# Patient Record
Sex: FEMALE | HISPANIC OR LATINO | Employment: UNEMPLOYED | URBAN - METROPOLITAN AREA
[De-identification: names, ages, dates, MRNs, and addresses within clinical notes are randomized per-mention and may not be internally consistent; named-entity substitution may affect disease eponyms.]

---

## 2017-01-12 ENCOUNTER — TELEPHONE (OUTPATIENT)
Dept: OBSTETRICS AND GYNECOLOGY | Facility: CLINIC | Age: 32
End: 2017-01-12

## 2017-01-12 NOTE — TELEPHONE ENCOUNTER
----- Message from Kimberlynsuma Mccoy sent at 1/12/2017  2:10 PM CST -----  Contact: self  Pt would like to know if she can come in on Monday for her post-op that she missed. She states that she is having some vaginal bleeding. She also want to know if it's okay for her to see another ob/gyn specialist in the clinic for her post-op if Dr. Morelos doesn't not have an availability.  [phone]. Thanks!

## 2017-01-12 NOTE — TELEPHONE ENCOUNTER
----- Message from Kimberlynsuma Mccoy sent at 1/12/2017  2:10 PM CST -----  Contact: self  Pt would like to know if she can come in on Monday for her post-op that she missed. She states that she is having some vaginal bleeding. She also want to know if it's okay for her to see another ob/gyn specialist in the clinic for her post-op if Dr. Morelos doesn't not have an availability.  [phone]. Thanks!

## 2017-01-12 NOTE — TELEPHONE ENCOUNTER
Left message for patient in regards to appointment. Have scheduled patient, patient will call back.

## 2017-01-16 ENCOUNTER — OFFICE VISIT (OUTPATIENT)
Dept: OBSTETRICS AND GYNECOLOGY | Facility: CLINIC | Age: 32
End: 2017-01-16
Payer: MEDICAID

## 2017-01-16 VITALS
SYSTOLIC BLOOD PRESSURE: 120 MMHG | WEIGHT: 178.56 LBS | HEIGHT: 65 IN | DIASTOLIC BLOOD PRESSURE: 76 MMHG | BODY MASS INDEX: 29.75 KG/M2

## 2017-01-16 DIAGNOSIS — N76.0 BACTERIAL VAGINOSIS: ICD-10-CM

## 2017-01-16 DIAGNOSIS — B96.89 BACTERIAL VAGINOSIS: ICD-10-CM

## 2017-01-16 DIAGNOSIS — N93.9 VAGINAL BLEEDING: Primary | ICD-10-CM

## 2017-01-16 PROCEDURE — 99212 OFFICE O/P EST SF 10 MIN: CPT | Mod: PBBFAC | Performed by: OBSTETRICS & GYNECOLOGY

## 2017-01-16 PROCEDURE — 99999 PR PBB SHADOW E&M-EST. PATIENT-LVL II: CPT | Mod: PBBFAC,,, | Performed by: OBSTETRICS & GYNECOLOGY

## 2017-01-16 PROCEDURE — 99024 POSTOP FOLLOW-UP VISIT: CPT | Mod: S$PBB,,, | Performed by: OBSTETRICS & GYNECOLOGY

## 2017-01-16 RX ORDER — METRONIDAZOLE 500 MG/1
500 TABLET ORAL EVERY 12 HOURS
Qty: 14 TABLET | Refills: 0 | Status: SHIPPED | OUTPATIENT
Start: 2017-01-16 | End: 2017-01-23

## 2017-01-16 NOTE — PROGRESS NOTES
Subjective:       Patient ID: Melissa Larios is a 31 y.o. female.    Chief Complaint:  Post-op Evaluation (lsh)      History of Present Illness  HPI  Pt is >6 weeks out from LSH. She reports vaginal bleeding. Had to use a tampon and it lasted for couple days. She reports premenstural symptoms. She is also reporting vaginal discharge with odor, and vulvar irritation.     GYN & OB History  Patient's last menstrual period was 11/15/2016.   Date of Last Pap: 2016    OB History    Para Term  AB SAB TAB Ectopic Multiple Living   5 3 3  1 1    3      # Outcome Date GA Lbr Ad/2nd Weight Sex Delivery Anes PTL Lv   5 Term 12 39w2d  3.697 kg (8 lb 2.4 oz) F Vag-Spont EPI N Y   4 SAB            3 Term 07   2.892 kg (6 lb 6 oz) F Vag-Spont EPI N Y   2 Term 06   3.204 kg (7 lb 1 oz) M Vag-Spont EPI N Y   1                    Review of Systems  Review of Systems   Constitutional: Negative for chills and fever.   Gastrointestinal: Negative for abdominal pain.   Genitourinary: Positive for vaginal discharge and vaginal odor. Negative for pelvic pain.           Objective:    Physical Exam:   Constitutional: She is oriented to person, place, and time. She appears well-developed and well-nourished. No distress.    HENT:   Head: Normocephalic and atraumatic.    Eyes: EOM are normal.      Pulmonary/Chest: No respiratory distress.        Abdominal: Soft. Normal appearance. She exhibits no distension. There is no tenderness. There is no rigidity, no rebound and no guarding.     Genitourinary: There is no rash, tenderness, lesion or injury on the right labia. There is no rash, tenderness, lesion or injury on the left labia. Uterus is absent. Right adnexum displays no mass, no tenderness and no fullness. Left adnexum displays no mass, no tenderness and no fullness. No erythema, tenderness or bleeding in the vagina. Vaginal discharge found. Cervix exhibits friability. Cervix exhibits no  motion tenderness and no discharge.               Neurological: She is alert and oriented to person, place, and time.     Psychiatric: She has a normal mood and affect. Her behavior is normal.          Assessment:        1. Vaginal bleeding    2. Bacterial vaginosis              Plan:      1. Vaginal bleeding  - Cervix is friable. Will give Rx for flagyl to treat BV and possible cervicitis. We discussed that she may have small cycle each month due to cervix being left. She might have some endometrial tissue in the cervix/lower uterine segment that will bleed with hormone changes.     2. Bacterial vaginosis  - metronidazole (FLAGYL) 500 MG tablet; Take 1 tablet (500 mg total) by mouth every 12 (twelve) hours.  Dispense: 14 tablet; Refill: 0       Return in about 7 months (around 8/16/2017) for Annual exam.

## 2017-02-15 ENCOUNTER — HOSPITAL ENCOUNTER (EMERGENCY)
Facility: HOSPITAL | Age: 32
Discharge: HOME | End: 2017-02-16
Attending: EMERGENCY MEDICINE
Payer: MEDICAID

## 2017-02-15 DIAGNOSIS — R10.9 RIGHT FLANK PAIN: Primary | ICD-10-CM

## 2017-02-15 LAB
ALBUMIN SERPL BCP-MCNC: 4 G/DL
ALP SERPL-CCNC: 66 U/L
ALT SERPL W/O P-5'-P-CCNC: 36 U/L
ANION GAP SERPL CALC-SCNC: 9 MMOL/L
AST SERPL-CCNC: 27 U/L
B-HCG UR QL: NEGATIVE
BACTERIA #/AREA URNS HPF: ABNORMAL /HPF
BASOPHILS # BLD AUTO: 0.04 K/UL
BASOPHILS NFR BLD: 0.6 %
BILIRUB SERPL-MCNC: 0.4 MG/DL
BILIRUB UR QL STRIP: NEGATIVE
BUN SERPL-MCNC: 9 MG/DL
CALCIUM SERPL-MCNC: 9.8 MG/DL
CHLORIDE SERPL-SCNC: 105 MMOL/L
CLARITY UR: CLEAR
CO2 SERPL-SCNC: 25 MMOL/L
COLOR UR: YELLOW
CREAT SERPL-MCNC: 0.7 MG/DL
CTP QC/QA: YES
DIFFERENTIAL METHOD: ABNORMAL
EOSINOPHIL # BLD AUTO: 0.2 K/UL
EOSINOPHIL NFR BLD: 2.3 %
ERYTHROCYTE [DISTWIDTH] IN BLOOD BY AUTOMATED COUNT: 13.1 %
EST. GFR  (AFRICAN AMERICAN): >60 ML/MIN/1.73 M^2
EST. GFR  (NON AFRICAN AMERICAN): >60 ML/MIN/1.73 M^2
GLUCOSE SERPL-MCNC: 89 MG/DL
GLUCOSE UR QL STRIP: NEGATIVE
HCT VFR BLD AUTO: 39.7 %
HGB BLD-MCNC: 13.6 G/DL
HGB UR QL STRIP: ABNORMAL
KETONES UR QL STRIP: NEGATIVE
LEUKOCYTE ESTERASE UR QL STRIP: NEGATIVE
LYMPHOCYTES # BLD AUTO: 2.6 K/UL
LYMPHOCYTES NFR BLD: 37.9 %
MCH RBC QN AUTO: 31.7 PG
MCHC RBC AUTO-ENTMCNC: 34.3 %
MCV RBC AUTO: 93 FL
MICROSCOPIC COMMENT: ABNORMAL
MONOCYTES # BLD AUTO: 0.6 K/UL
MONOCYTES NFR BLD: 8.9 %
NEUTROPHILS # BLD AUTO: 3.4 K/UL
NEUTROPHILS NFR BLD: 50.3 %
NITRITE UR QL STRIP: NEGATIVE
PH UR STRIP: 7 [PH] (ref 5–8)
PLATELET # BLD AUTO: 284 K/UL
PMV BLD AUTO: 10.7 FL
POTASSIUM SERPL-SCNC: 3.8 MMOL/L
PROT SERPL-MCNC: 7.5 G/DL
PROT UR QL STRIP: NEGATIVE
RBC # BLD AUTO: 4.29 M/UL
RBC #/AREA URNS HPF: 10 /HPF (ref 0–4)
SODIUM SERPL-SCNC: 139 MMOL/L
SP GR UR STRIP: 1.01 (ref 1–1.03)
SQUAMOUS #/AREA URNS HPF: 3 /HPF
URN SPEC COLLECT METH UR: ABNORMAL
UROBILINOGEN UR STRIP-ACNC: NEGATIVE EU/DL
WBC # BLD AUTO: 6.84 K/UL
WBC #/AREA URNS HPF: 2 /HPF (ref 0–5)

## 2017-02-15 PROCEDURE — 85025 COMPLETE CBC W/AUTO DIFF WBC: CPT

## 2017-02-15 PROCEDURE — 96375 TX/PRO/DX INJ NEW DRUG ADDON: CPT

## 2017-02-15 PROCEDURE — 96374 THER/PROPH/DIAG INJ IV PUSH: CPT

## 2017-02-15 PROCEDURE — 25000003 PHARM REV CODE 250: Performed by: PHYSICIAN ASSISTANT

## 2017-02-15 PROCEDURE — 87086 URINE CULTURE/COLONY COUNT: CPT

## 2017-02-15 PROCEDURE — 81000 URINALYSIS NONAUTO W/SCOPE: CPT

## 2017-02-15 PROCEDURE — 87186 SC STD MICRODIL/AGAR DIL: CPT

## 2017-02-15 PROCEDURE — 87088 URINE BACTERIA CULTURE: CPT

## 2017-02-15 PROCEDURE — 87077 CULTURE AEROBIC IDENTIFY: CPT

## 2017-02-15 PROCEDURE — 96361 HYDRATE IV INFUSION ADD-ON: CPT

## 2017-02-15 PROCEDURE — 99284 EMERGENCY DEPT VISIT MOD MDM: CPT | Mod: 25

## 2017-02-15 PROCEDURE — 63600175 PHARM REV CODE 636 W HCPCS: Performed by: PHYSICIAN ASSISTANT

## 2017-02-15 PROCEDURE — 80053 COMPREHEN METABOLIC PANEL: CPT

## 2017-02-15 PROCEDURE — 81025 URINE PREGNANCY TEST: CPT | Performed by: EMERGENCY MEDICINE

## 2017-02-15 RX ORDER — KETOROLAC TROMETHAMINE 30 MG/ML
15 INJECTION, SOLUTION INTRAMUSCULAR; INTRAVENOUS
Status: COMPLETED | OUTPATIENT
Start: 2017-02-15 | End: 2017-02-15

## 2017-02-15 RX ORDER — NAPROXEN 500 MG/1
250 TABLET ORAL 2 TIMES DAILY WITH MEALS
Qty: 12 TABLET | Refills: 0 | Status: SHIPPED | OUTPATIENT
Start: 2017-02-15 | End: 2017-02-27

## 2017-02-15 RX ORDER — ONDANSETRON 4 MG/1
4 TABLET, FILM COATED ORAL EVERY 6 HOURS PRN
Qty: 12 TABLET | Refills: 0 | Status: SHIPPED | OUTPATIENT
Start: 2017-02-15 | End: 2017-02-20

## 2017-02-15 RX ORDER — KETOROLAC TROMETHAMINE 30 MG/ML
15 INJECTION, SOLUTION INTRAMUSCULAR; INTRAVENOUS
Status: DISCONTINUED | OUTPATIENT
Start: 2017-02-15 | End: 2017-02-15

## 2017-02-15 RX ORDER — ONDANSETRON 2 MG/ML
4 INJECTION INTRAMUSCULAR; INTRAVENOUS
Status: COMPLETED | OUTPATIENT
Start: 2017-02-15 | End: 2017-02-15

## 2017-02-15 RX ADMIN — ONDANSETRON 4 MG: 2 INJECTION INTRAMUSCULAR; INTRAVENOUS at 11:02

## 2017-02-15 RX ADMIN — KETOROLAC TROMETHAMINE 15 MG: 30 INJECTION, SOLUTION INTRAMUSCULAR at 11:02

## 2017-02-15 RX ADMIN — SODIUM CHLORIDE 1000 ML: 0.9 INJECTION, SOLUTION INTRAVENOUS at 11:02

## 2017-02-15 NOTE — ED AVS SNAPSHOT
[facility]  [place] [address]               Melissa Larios   2/15/2017  9:57 PM   ED    Description:  Female : 1985   Department:  [facility]           Your Care was Coordinated By:     Provider Role From To    Jaime Holliday MD Attending Provider 02/15/17 2202 --    Anh Guevara PA-C Physician Assistant 02/15/17 2202 --      Reason for Visit     Back Pain     Female  Problem           Diagnoses this Visit        Comments    Right flank pain    -  Primary       ED Disposition     None           To Do List           Follow-up Information     Follow up with Rachel Meléndez MD. Schedule an appointment as soon as possible for a visit in 2 days.    Specialty:  Urology    Why:  for follow up    Contact information:    [address]  [place] [address]  [phone]          Go to [facility].    Specialty:  Emergency Medicine    Why:  As needed, If symptoms worsen, if unable to urinate or decreased urination    Contact information:    [place] [address] [phone]       These Medications        Disp Refills Start End    tamsulosin (FLOMAX) 0.4 mg Cp24 12 capsule 0 2/15/2017 2017    Take 1 capsule (0.4 mg total) by mouth once daily. - Oral    Pharmacy: [facility] [facility] [place] [address] Ph #: [phone]       ondansetron (ZOFRAN) 4 MG tablet 12 tablet 0 2/15/2017 2017    Take 1 tablet (4 mg total) by mouth every 6 (six) hours as needed for Nausea. - Oral    Pharmacy: [facility] [facility] [place] [facility] Ph #: [phone]       naproxen (NAPROSYN) 500 MG tablet 12 tablet 0 2/15/2017 2017    Take 0.5 tablets (250 mg total) by mouth 2 (two) times daily with meals. - Oral    Pharmacy: [facility] [facility] [place] [address] Ph #: [phone]         [facility]      [facility] - 24/7 Assistance  Registered nurses in the [facility] provide clinical advisement, health education, appointment booking, and other advisory services.  Call for this free service at [phone].             Medications           Message regarding Medications     Verify the changes and/or additions to your medication regime listed below are the same as discussed with your clinician today.  If any of these changes or additions are incorrect, please notify your healthcare provider.        START taking these NEW medications        Refills    tamsulosin (FLOMAX) 0.4 mg Cp24 0    Sig: Take 1 capsule (0.4 mg total) by mouth once daily.    Class: Print    Route: Oral    ondansetron (ZOFRAN) 4 MG tablet 0    Sig: Take 1 tablet (4 mg total) by mouth every 6 (six) hours as needed for Nausea.    Class: Print    Route: Oral    naproxen (NAPROSYN) 500 MG tablet 0    Sig: Take 0.5 tablets (250 mg total) by mouth 2 (two) times daily with meals.    Class: Print    Route: Oral      These medications were administered today        Dose Freq    sodium chloride 0.9% bolus 1,000 mL 1,000 mL ED 1 Time    Sig: Inject 1,000 mLs into the vein ED 1 Time.    Class: Normal    Route: Intravenous    Cosign for Ordering: Accepted by Jaime Holliday MD on 2/15/2017 11:06 PM    ondansetron injection 4 mg 4 mg ED 1 Time    Sig: Inject 4 mg into the vein ED 1 Time.    Class: Normal    Route: Intravenous    Cosign for Ordering: Accepted by Jaime Holliday MD on 2/15/2017 11:06 PM    ketorolac injection 15 mg 15 mg ED 1 Time    Sig: Inject 15 mg into the vein ED 1 Time.    Class: Normal    Route: Intravenous    Cosign for Ordering: Accepted by Jaime Holliday MD on 2/15/2017 11:06 PM      STOP taking these medications     ibuprofen (ADVIL,MOTRIN) 800 MG tablet Take 1 tablet (800 mg total) by mouth 4 (four) times daily.           Verify that the below list of medications is an accurate representation  "of the medications you are currently taking.  If none reported, the list may be blank. If incorrect, please contact your healthcare provider. Carry this list with you in case of emergency.           Current Medications     ferrous sulfate 325 (65 FE) MG EC tablet Take 325 mg by mouth 3 (three) times daily with meals.    naproxen (NAPROSYN) 500 MG tablet Take 0.5 tablets (250 mg total) by mouth 2 (two) times daily with meals.    ondansetron (ZOFRAN) 4 MG tablet Take 1 tablet (4 mg total) by mouth every 6 (six) hours as needed for Nausea.    tamsulosin (FLOMAX) 0.4 mg Cp24 Take 1 capsule (0.4 mg total) by mouth once daily.           Clinical Reference Information           Your Vitals Were     BP Pulse Temp Resp Height Weight    120/57 (BP Location: Right arm, Patient Position: Sitting) 91 98.5 °F (36.9 °C) (Oral) 20 5' 5" (1.651 m) 81.6 kg (180 lb)    Last Period SpO2 BMI          11/15/2016 96% 29.95 kg/m2        Allergies as of 2/15/2017        Reactions    Codeine Itching    All over itching    Hydrocodone Itching      Immunizations Administered on Date of Encounter - 2/15/2017     None      ED Micro, Lab, POCT     Start Ordered       Status Ordering Provider    02/15/17 2226 02/15/17 2227  CBC auto differential  STAT      Final result     02/15/17 2226 02/15/17 2227  Comprehensive metabolic panel  STAT      Final result     02/15/17 2204 02/15/17 2204  Urine culture **CANNOT BE ORDERED STAT**  Once      In process     02/15/17 2037 02/15/17 2036  Urinalysis  STAT      Final result     02/15/17 2036 02/15/17 2036  Urinalysis Microscopic  Once      Final result     02/15/17 2015 02/15/17 2014  POCT urine pregnancy  Once      Final result       ED Imaging Orders     Start Ordered       Status Ordering Provider    02/15/17 2227 02/15/17 2227  CT Renal Stone Study ABD Pelvis WO  1 time imaging      Final result         Discharge Instructions         Flank Pain, Uncertain Cause  The flank is the area between your upper " abdomen and your back. Pain there is often caused by a problem with your kidneys. It might be a kidney infection or a kidney stone. Other causes of flank pain include spinal arthritis, a pinched nerve from a back injury, or a back muscle strain or spasm.  The cause of your flank pain is not certain. You may need other tests.  Home care  Follow these tips when caring for yourself at home:  · You may use acetaminophen or ibuprofen to control pain, unless your health care provider prescribed another medicine. If you have chronic liver or kidney disease, talk with your provider before taking these medicines. Also talk with your provider first if youve ever had a stomach ulcer or GI bleeding.  · If the pain is coming from your muscles, you may get relief with ice or heat. During the first 2 days after the injury, put an ice pack on the painful area for 20 minutes every 2 to 4 hours. This will reduce swelling and pain. A hot shower, hot bath, or heating pad works well for a muscle spasm. You can start with ice, then switch to heat after 2 days. You might find that alternating ice and heat works well. Use the method that feels the best to you.  Follow-up care  Follow up with your healthcare provider if your symptoms dont get better over the next few days.  When to seek medical advice  Call your healthcare provider right away if any of these happen:  · Repeated vomiting  · Fever of 100.4ºF (38ºC) or higher, or as directed by your health care provider  · Flank pain that gets worse  · Pain that spreads to the front of your belly (abdomen)  · Dizziness, weakness, or fainting  · Blood in your urine  · Burning feeling when you urinate or the need to urinate often  · Pain in one of your legs that gets worse  · Numbness or weakness in a leg        [facility] Sign-Up     Activating your [facility] account is as easy as 1-2-3!     1) Visit [email or web address], select Sign Up Now, enter this activation code and your date of birth, then  select Next.  [email or web address]  Expires: 3/2/2017  2:54 PM      2) Create a username and password to use when you visit [facility] in the future and select a security question in case you lose your password and select Next.    3) Enter your e-mail address and click Sign Up!    Additional Information  If you have questions, please e-mail [email or web address] or call [phone] to talk to our [facility] staff. Remember, [facility] is NOT to be used for urgent needs. For medical emergencies, dial 911.          [facility] complies with applicable Federal civil rights laws and does not discriminate on the basis of race, color, national origin, age, disability, or sex.        Language Assistance Services     ATTENTION: Language assistance services are available, free of charge. Please call [phone].      ATENCIÓN: Si habla español, tiene a fox disposición servicios gratuitos de asistencia lingüística. Llame al [phone].     [facility] Ý: N?u b?n nói Ti?ng Vi?t, có các d?ch v? h? tr? ngôn ng? mi?n phí dành cho b?n. G?i s? [phone].

## 2017-02-16 VITALS
WEIGHT: 180 LBS | HEIGHT: 65 IN | HEART RATE: 62 BPM | TEMPERATURE: 98 F | BODY MASS INDEX: 29.99 KG/M2 | DIASTOLIC BLOOD PRESSURE: 72 MMHG | RESPIRATION RATE: 20 BRPM | OXYGEN SATURATION: 99 % | SYSTOLIC BLOOD PRESSURE: 120 MMHG

## 2017-02-16 NOTE — DISCHARGE INSTRUCTIONS
Flank Pain, Uncertain Cause  The flank is the area between your upper abdomen and your back. Pain there is often caused by a problem with your kidneys. It might be a kidney infection or a kidney stone. Other causes of flank pain include spinal arthritis, a pinched nerve from a back injury, or a back muscle strain or spasm.  The cause of your flank pain is not certain. You may need other tests.  Home care  Follow these tips when caring for yourself at home:  · You may use acetaminophen or ibuprofen to control pain, unless your health care provider prescribed another medicine. If you have chronic liver or kidney disease, talk with your provider before taking these medicines. Also talk with your provider first if youve ever had a stomach ulcer or GI bleeding.  · If the pain is coming from your muscles, you may get relief with ice or heat. During the first 2 days after the injury, put an ice pack on the painful area for 20 minutes every 2 to 4 hours. This will reduce swelling and pain. A hot shower, hot bath, or heating pad works well for a muscle spasm. You can start with ice, then switch to heat after 2 days. You might find that alternating ice and heat works well. Use the method that feels the best to you.  Follow-up care  Follow up with your healthcare provider if your symptoms dont get better over the next few days.  When to seek medical advice  Call your healthcare provider right away if any of these happen:  · Repeated vomiting  · Fever of 100.4ºF (38ºC) or higher, or as directed by your health care provider  · Flank pain that gets worse  · Pain that spreads to the front of your belly (abdomen)  · Dizziness, weakness, or fainting  · Blood in your urine  · Burning feeling when you urinate or the need to urinate often  · Pain in one of your legs that gets worse  · Numbness or weakness in a leg

## 2017-02-16 NOTE — ED PROVIDER NOTES
"Encounter Date: 2/15/2017    SCRIBE #1 NOTE: I, Rennyjesús Hurtado, am scribing for, and in the presence of,  Anh Guevara PA-C. I have scribed the following portions of the note - Other sections scribed: HPI/ROS.       History     Chief Complaint   Patient presents with    Back Pain     Right sided back pain that started last night. + Dysuria. Pt reports she thinks she has a kidney stone    Female  Problem     " I had a hysterectomy on the 14th of Nov but I am still bleeding vaginally."      Review of patient's allergies indicates:   Allergen Reactions    Codeine Itching     All over itching    Hydrocodone Itching     HPI Comments: CC: Female  Problem    HPI: This 31 y.o. Female presents to the ED for evaluation of right sided flank pain and malodorous urine for past 2 days and dysuria, nausea and urinary frequency that developed this morning. Pt states flank pain is sharp, 7/10, and worse with walking. Pt states she has attempted treatment with ibuprofen with no relief. States that swaying back and forth helps mildly alleviates symptoms. Pt states she has had 5 episodes of kidney stones in past 5 years. States this pain is similar and she will typically just pass them at home, but she is concerned because this time she feels like the stone is "not moving". She states previous imaging performed has never shown kidney stones, but she "knows there are stones because I passed them". Pt also reports she had hysterectomy and is still experiencing vaginal bleeding. Pt was evaluated for same complaint by OB/GYN approximately 1 month ago and they reassured her this was normal. Pt states that there is no change in the bleeding since last month and she stated she did not want pelvic exam today. The pt denies hematuria, abdominal pain, headaches, or lightheadedness.       The history is provided by the patient. No  was used.     Past Medical History   Diagnosis Date    Kidney stones     Rh " incompatibility 09/14/2012     rhogam given here    Vaginal delivery      x4     No past medical history pertinent negatives.  Past Surgical History   Procedure Laterality Date    Tubal ligation      Hysterectomy       Family History   Problem Relation Age of Onset    Diabetes Maternal Aunt     Diabetes Paternal Aunt     Vision loss Paternal Uncle     Diabetes Paternal Uncle     Arthritis Maternal Grandmother     Diabetes Maternal Grandmother     Hyperlipidemia Maternal Grandmother     Hypertension Maternal Grandmother     Arthritis Paternal Grandmother     Diabetes Paternal Grandmother     Hypertension Paternal Grandmother     Breast cancer Neg Hx     Colon cancer Neg Hx     Ovarian cancer Neg Hx      Social History   Substance Use Topics    Smoking status: Never Smoker    Smokeless tobacco: Never Used    Alcohol use No     Review of Systems   Constitutional: Negative for chills and fever.   HENT: Negative for congestion, rhinorrhea and sore throat.    Respiratory: Negative for shortness of breath.    Cardiovascular: Negative for chest pain.   Gastrointestinal: Positive for nausea. Negative for abdominal pain, constipation, diarrhea and vomiting.   Genitourinary: Positive for dysuria, flank pain, frequency and vaginal bleeding. Negative for decreased urine volume, difficulty urinating, hematuria, urgency and vaginal discharge.   Musculoskeletal: Negative for back pain.   Skin: Negative for rash.   Neurological: Negative for weakness, light-headedness and headaches.   Hematological: Does not bruise/bleed easily.       Physical Exam   Initial Vitals   BP Pulse Resp Temp SpO2   02/15/17 2030 02/15/17 2030 02/15/17 2030 02/15/17 2030 02/15/17 2030   120/57 91 20 98.5 °F (36.9 °C) 96 %     Physical Exam    Nursing note and vitals reviewed.  Constitutional: Vital signs are normal.   HENT:   Right Ear: External ear normal.   Left Ear: External ear normal.   Mouth/Throat: Oropharynx is clear and moist.  "  Eyes: Conjunctivae are normal.   Cardiovascular: Normal rate and regular rhythm. Exam reveals no gallop and no friction rub.    No murmur heard.  Pulmonary/Chest: Breath sounds normal. No respiratory distress. She has no wheezes. She has no rhonchi. She has no rales.   Abdominal: Soft. Bowel sounds are normal. She exhibits no distension. There is no tenderness. There is CVA tenderness (Right CVA tenderness). There is no rebound and no guarding.   Neurological: She is alert.   Skin: Skin is warm and dry. No rash noted.         ED Course   Procedures  Labs Reviewed   URINALYSIS - Abnormal; Notable for the following:        Result Value    Occult Blood UA 2+ (*)     All other components within normal limits   CBC W/ AUTO DIFFERENTIAL - Abnormal; Notable for the following:     MCH 31.7 (*)     All other components within normal limits   URINALYSIS MICROSCOPIC - Abnormal; Notable for the following:     RBC, UA 10 (*)     Bacteria, UA Moderate (*)     All other components within normal limits   CULTURE, URINE   COMPREHENSIVE METABOLIC PANEL   POCT URINE PREGNANCY             Medical Decision Making:   Initial Assessment:   Pt is 30 y/o female who presents for evaluation of R flank pain since Monday night with associated malodorous urine and dysuria since this morning. Pt reports history of 5 episodes of kidney stones in the past 5 years. She states she normally passes them at home, but came in for evaluation this time because she "feels like the stone is not moving." Denies difficulty urinating, weak stream, decreased urination, or hematuria. On exam, pt afebrile, standing and swaying back and forth "to relieve the pain." Physical exam remarkable for right CVA tenderness. No abdominal tenderness. Pt given fluids, toradol, and zofran. CBC, CMP unremarkable. UA remarkable for 2+ occult blood, moderate bacteria. CT renal stone study negative for kidney stones. Discharged pt to home with flomax, zofran and naproxen and " urology follow up. Return to ER if symptoms worsen or if decreased urine output, or unable to urinate.    Discussed this pt with Dr. Holliday who agrees with assessment and plan.             Scribe Attestation:   Scribe #1: I performed the above scribed service and the documentation accurately describes the services I performed. I attest to the accuracy of the note.    Attending Attestation:     Physician Attestation Statement for NP/PA:   I discussed this assessment and plan of this patient with the NP/PA, but I did not personally examine the patient. The face to face encounter was performed by the NP/PA.    Other NP/PA Attestation Additions:      Medical Decision Makin-year-old female presenting with right-sided back pain that started last night.  Vital signs unremarkable.  CT shows no renal stones.  I agree with plan.         Physician Attestation for Scribe:  Physician Attestation Statement for Scribe #1: I, Anh Guevara PA-C, reviewed documentation, as scribed by Renny Hurtado in my presence, and it is both accurate and complete.                 ED Course     Clinical Impression:   The encounter diagnosis was Right flank pain.    Disposition:   Disposition: Discharged  Condition: Stable       Anh Guevara PA-C  17 0049       Jaime Holliday MD  17 0815

## 2017-02-17 LAB — BACTERIA UR CULT: NORMAL

## 2017-03-04 ENCOUNTER — HOSPITAL ENCOUNTER (EMERGENCY)
Facility: HOSPITAL | Age: 32
Discharge: HOME | End: 2017-03-04
Attending: EMERGENCY MEDICINE
Payer: MEDICAID

## 2017-03-04 ENCOUNTER — NURSE TRIAGE (OUTPATIENT)
Dept: ADMINISTRATIVE | Facility: CLINIC | Age: 32
End: 2017-03-04

## 2017-03-04 VITALS
HEIGHT: 65 IN | RESPIRATION RATE: 18 BRPM | DIASTOLIC BLOOD PRESSURE: 56 MMHG | HEART RATE: 86 BPM | WEIGHT: 178 LBS | BODY MASS INDEX: 29.66 KG/M2 | OXYGEN SATURATION: 98 % | SYSTOLIC BLOOD PRESSURE: 120 MMHG | TEMPERATURE: 98 F

## 2017-03-04 DIAGNOSIS — N39.0 URINARY TRACT INFECTION WITHOUT HEMATURIA, SITE UNSPECIFIED: Primary | ICD-10-CM

## 2017-03-04 LAB
B-HCG UR QL: NEGATIVE
BACTERIA #/AREA URNS HPF: ABNORMAL /HPF
BILIRUB UR QL STRIP: NEGATIVE
CLARITY UR: CLEAR
COLOR UR: YELLOW
CTP QC/QA: YES
GLUCOSE UR QL STRIP: NEGATIVE
HGB UR QL STRIP: ABNORMAL
KETONES UR QL STRIP: NEGATIVE
LEUKOCYTE ESTERASE UR QL STRIP: NEGATIVE
MICROSCOPIC COMMENT: ABNORMAL
NITRITE UR QL STRIP: NEGATIVE
PH UR STRIP: 5 [PH] (ref 5–8)
PROT UR QL STRIP: NEGATIVE
RBC #/AREA URNS HPF: 2 /HPF (ref 0–4)
SP GR UR STRIP: 1.01 (ref 1–1.03)
SQUAMOUS #/AREA URNS HPF: 10 /HPF
URN SPEC COLLECT METH UR: ABNORMAL
UROBILINOGEN UR STRIP-ACNC: NEGATIVE EU/DL
WBC #/AREA URNS HPF: 2 /HPF (ref 0–5)

## 2017-03-04 PROCEDURE — 99283 EMERGENCY DEPT VISIT LOW MDM: CPT | Mod: 25

## 2017-03-04 PROCEDURE — 81000 URINALYSIS NONAUTO W/SCOPE: CPT

## 2017-03-04 PROCEDURE — 87077 CULTURE AEROBIC IDENTIFY: CPT

## 2017-03-04 PROCEDURE — 87088 URINE BACTERIA CULTURE: CPT

## 2017-03-04 PROCEDURE — 87186 SC STD MICRODIL/AGAR DIL: CPT

## 2017-03-04 PROCEDURE — 87086 URINE CULTURE/COLONY COUNT: CPT

## 2017-03-04 PROCEDURE — 81025 URINE PREGNANCY TEST: CPT | Performed by: EMERGENCY MEDICINE

## 2017-03-04 RX ORDER — PHENAZOPYRIDINE HYDROCHLORIDE 200 MG/1
200 TABLET, FILM COATED ORAL 3 TIMES DAILY
Qty: 6 TABLET | Refills: 0 | Status: SHIPPED | OUTPATIENT
Start: 2017-03-04 | End: 2017-03-14

## 2017-03-04 RX ORDER — AMOXICILLIN AND CLAVULANATE POTASSIUM 875; 125 MG/1; MG/1
1 TABLET, FILM COATED ORAL 2 TIMES DAILY
Qty: 14 TABLET | Refills: 0 | Status: SHIPPED | OUTPATIENT
Start: 2017-03-04 | End: 2017-03-14

## 2017-03-04 NOTE — DISCHARGE INSTRUCTIONS

## 2017-03-04 NOTE — ED AVS SNAPSHOT
[facility]  [address] [place] [address]               Melissa Larios   3/4/2017  3:41 PM   ED    Description:  Female : 1985   Department:  [facility]           Your Care was Coordinated By:     Provider Role From To    Yonas Toro MD Attending Provider 17 [id number] --    GILBERTO Denis Physician Assistant 17 [id number] --      Reason for Visit     Lab/xray reassessment           Diagnoses this Visit        Comments    Urinary tract infection without hematuria, site unspecified    -  Primary       ED Disposition     None           To Do List           Follow-up Information     Schedule an appointment as soon as possible for a visit with CLAUDIA Petty MD.    Specialty:  Urology    Contact information:    [address]  [address]  [place] [address]  [phone]         These Medications        Disp Refills Start End    amoxicillin-clavulanate 875-125mg (AUGMENTIN) 875-125 mg per tablet 14 tablet 0 3/4/2017 3/14/2017    Take 1 tablet by mouth 2 (two) times daily. - Oral    [facility] [address] [facility] [place] [address] Ph #: [phone]       phenazopyridine (PYRIDIUM) 200 MG tablet 6 tablet 0 3/4/2017 3/14/2017    Take 1 tablet (200 mg total) by mouth 3 (three) times daily. - Oral    [facility]: [facility] [place] [facility] Ph #: [phone]         [facility]     [facility] -  Assistance  Registered nurses in the [facility] provide clinical advisement, health education, appointment booking, and other advisory services.  Call for this free service at [phone].             Medications           Message regarding Medications     Verify the changes and/or additions to your medication regime listed below are the same as discussed with your clinician today.  If any of these changes or additions are incorrect, please  "notify your healthcare provider.        START taking these NEW medications        Refills    amoxicillin-clavulanate 875-125mg (AUGMENTIN) 875-125 mg per tablet 0    Sig: Take 1 tablet by mouth 2 (two) times daily.    Class: Print    Route: Oral    phenazopyridine (PYRIDIUM) 200 MG tablet 0    Sig: Take 1 tablet (200 mg total) by mouth 3 (three) times daily.    Class: Print    Route: Oral           Verify that the below list of medications is an accurate representation of the medications you are currently taking.  If none reported, the list may be blank. If incorrect, please contact your healthcare provider. Carry this list with you in case of emergency.           Current Medications     amoxicillin-clavulanate 875-125mg (AUGMENTIN) 875-125 mg per tablet Take 1 tablet by mouth 2 (two) times daily.    ferrous sulfate 325 (65 FE) MG EC tablet Take 325 mg by mouth 3 (three) times daily with meals.    phenazopyridine (PYRIDIUM) 200 MG tablet Take 1 tablet (200 mg total) by mouth 3 (three) times daily.    tamsulosin (FLOMAX) 0.4 mg Cp24 Take 1 capsule (0.4 mg total) by mouth once daily.           Clinical Reference Information           Your Vitals Were     BP Pulse Temp Resp Height Weight    120/56 (BP Location: Left arm) 86 98.3 °F (36.8 °C) 18 5' 5" (1.651 m) 80.7 kg (178 lb)    Last Period SpO2 BMI          11/15/2016 98% 29.62 kg/m2        Allergies as of 3/4/2017        Reactions    Codeine Itching    All over itching    Hydrocodone Itching      Immunizations Administered on Date of Encounter - 3/4/2017     None      ED Micro, Lab, POCT     Start Ordered       Status Ordering Provider    03/04/17 1542 03/04/17 1541  Urinalysis  STAT      Ordered     03/04/17 1459 03/04/17 1458  POCT urine pregnancy  Once      Final result       ED Imaging Orders     None        Discharge Instructions         Bladder Infection, Female (Adult)    Urine is normally doesn't have any bacteria in it. But bacteria can get into the urinary " "tract from the skin around the rectum. Or they can travel in the blood from elsewhere in the body. Once they are in your urinary tract, they can cause infection in the urethra (urethritis), the bladder (cystitis), or the kidneys (pyelonephritis).  The most common place for an infection is in the bladder. This is called a bladder infection. This is one of the most common infections in women. Most bladder infections are easily treated. They are not serious unless the infection spreads to the kidney.  The phrases "bladder infection," "UTI," and "cystitis" are often used to describe the same thing. But they are not always the same. Cystitis is an inflammation of the bladder. The most common cause of cystitis is an infection.  Symptoms  The infection causes inflammation in the urethra and bladder. This causes many of the symptoms. The most common symptoms of a bladder infection are:  · Pain or burning when urinating  · Having to urinate more often than usual  · Urgent need to urinate  · Only a small amount of urine comes out  · Blood in urine  · Abdominal discomfort. This is usually in the lower abdomen above the pubic bone.  · Cloudy urine  · Strong- or bad-smelling urine  · Unable to urinate (urinary retention)  · Unable to hold urine in (urinary incontinence)  · Fever  · Loss of appetite  · Confusion (in older adults)  Causes  Bladder infections are not contagious. You can't get one from someone else, from a toilet seat, or from sharing a bath.  The most common cause of bladder infections is bacteria from the bowels. The bacteria get onto the skin around the opening of the urethra. From there, they can get into the urine and travel up to the bladder, causing inflammation and infection. This usually happens because of:  · Wiping improperly after urinating. Always wipe from front to back.  · Bowel incontinence  · Pregnancy  · Procedures such as having a catheter inserted  · Older age  · Not emptying your bladder. This " can allow bacteria a chance to grow in your urine.  · Dehydration  · Constipation  · Sex  · Use of a diaphragm for birth control   Treatment  Bladder infections are diagnosed by a urine test. They are treated with antibiotics and usually clear up quickly without complications. Treatment helps prevent a more serious kidney infection.  Medicines  Medicines can help in the treatment of a bladder infection:  · Take antibiotics until they are used up, even if you feel better. It is important to finish them to make sure the infection has cleared.  · You can use acetaminophen or ibuprofen for pain, fever, or discomfort, unless another medicine was prescribed. If you have chronic liver or kidney disease, talk with your healthcare provider before using these medicines. Also talk with your provider if you've ever had a stomach ulcer or gastrointestinal bleeding, or are taking blood-thinner medicines.  · If you are given phenazopydridine to reduce burning with urination, it will cause your urine to become a bright orange color. This can stain clothing.  Care and prevention  These self-care steps can help prevent future infections:  · Drink plenty of fluids to prevent dehydration and flush out your bladder. Do this unless you must restrict fluids for other health reasons, or your doctor told you not to.  · Proper cleaning after going to the bathroom is important. Wipe from front to back after using the toilet to prevent the spread of bacteria.  · Urinate more often. Don't try to hold urine in for a long time.  · Wear loose-fitting clothes and cotton underwear. Avoid tight-fitting pants.  · Improve your diet and prevent constipation. Eat more fresh fruit and vegetables, and fiber, and less junk and fatty foods.  · Avoid sex until your symptoms are gone.  · Avoid caffeine, alcohol, and spicy foods. These can irritate your bladder.  · Urinate right after intercourse to flush out your bladder.  · If you use birth control pills and  have frequent bladder infections, discuss it with your doctor.  Follow-up care  Call your healthcare provider if all symptoms are not gone after 3 days of treatment. This is especially important if you have repeat infections.  If a culture was done, you will be told if your treatment needs to be changed. If directed, you can call to find out the results.  If X-rays were done, you will be told if the results will affect your treatment.  Call 911  Call 911 if any of the following occur:  · Trouble breathing  · Hard to wake up or confusion  · Fainting or loss of consciousness  · Rapid heart rate  When to seek medical advice  Call your healthcare provider right away if any of these occur:  · Fever of 100.4ºF (38.0ºC) or higher, or as directed by your healthcare provider  · Symptoms are not better by the third day of treatment  · Back or belly (abdominal) pain that gets worse  · Repeated vomiting, or unable to keep medicine down  · Weakness or dizziness  · Vaginal discharge  · Pain, redness, or swelling in the outer vaginal area (labia)  Date Last Reviewed: 10/1/2016  © 4523-2719 [employer]. [address] [place] [address]. All rights reserved. This information is not intended as a substitute for professional medical care. Always follow your healthcare professional's instructions.          [facility] Sign-Up     Activating your [facility] account is as easy as 1-2-3!     1) Visit [email or web address], select Sign Up Now, enter this activation code and your date of birth, then select Next.  [email or web address]  Expires: 4/18/2017  3:51 PM      2) Create a username and password to use when you visit [facility] in the future and select a security question in case you lose your password and select Next.    3) Enter your e-mail address and click Sign Up!    Additional Information  If you have questions, please e-mail [email or web address] or call [phone] to talk to our [facility] staff. Remember, [facility]  is NOT to be used for urgent needs. For medical emergencies, dial 911.          [facility] complies with applicable Federal civil rights laws and does not discriminate on the basis of race, color, national origin, age, disability, or sex.        Language Assistance Services     ATTENTION: Language assistance services are available, free of charge. Please call [phone].      ATENCIÓN: Si habla español, tiene a fox disposición servicios gratuitos de asistencia lingüística. Llame al [phone].     CHÚ Ý: N?u b?n nói Ti?ng Vi?t, có các d?ch v? h? tr? ngôn ng? mi?n phí dành cho b?n. G?i s? [phone].

## 2017-03-04 NOTE — ED TRIAGE NOTES
Pt reports she was here on 2/15/17 for flank pain and dysuria. Still having flank pain and dysuria, upon review of culture of urine from this date shows +UTI on culture. No fevers.

## 2017-03-04 NOTE — TELEPHONE ENCOUNTER
Reason for Disposition   Health Information question, no triage required and triager able to answer question    Protocols used: ST INFORMATION ONLY CALL-A-    Patient calling because she received a letter from the ED  ( was in last month) telling her that test were abnormal and she needed to either follow up with primary or go back to ED.  Wanted to know if she should go back.  She does not have a primary.  Advised yes she should go to ED since they have been trying to contact her and she no pcp.  Voiced understanding.

## 2017-03-05 NOTE — ED PROVIDER NOTES
Encounter Date: 3/4/2017       History     Chief Complaint   Patient presents with    Lab/xray reassessment     States she got a letter to come to the ED to get reassessed     Review of patient's allergies indicates:   Allergen Reactions    Codeine Itching     All over itching    Hydrocodone Itching     HPI Comments: 31-year-old female presents to the emergency department for reassessment.  Patient received a certified letter to return to the emergency department for lab results.  Patient had a positive urine culture done on February 15 positive for Escherichia coli.  Patient was contacted by phone multiple times without success.  Patient reports dysuria and right flank pain today.  Patient denies fever, nausea, vomiting, or CVA tenderness.    Past Medical History:   Diagnosis Date    Kidney stones     Rh incompatibility 09/14/2012    rhogam given here    UTI (urinary tract infection)     Vaginal delivery     x4     Past Surgical History:   Procedure Laterality Date    HYSTERECTOMY      TUBAL LIGATION       Family History   Problem Relation Age of Onset    Diabetes Maternal Aunt     Diabetes Paternal Aunt     Vision loss Paternal Uncle     Diabetes Paternal Uncle     Arthritis Maternal Grandmother     Diabetes Maternal Grandmother     Hyperlipidemia Maternal Grandmother     Hypertension Maternal Grandmother     Arthritis Paternal Grandmother     Diabetes Paternal Grandmother     Hypertension Paternal Grandmother     Breast cancer Neg Hx     Colon cancer Neg Hx     Ovarian cancer Neg Hx      Social History   Substance Use Topics    Smoking status: Never Smoker    Smokeless tobacco: Never Used    Alcohol use No     Review of Systems   Constitutional: Negative for fever.   Respiratory: Negative for cough.    Cardiovascular: Negative for chest pain.   Gastrointestinal: Positive for abdominal pain (right flank). Negative for nausea and vomiting.   Genitourinary: Positive for dysuria. Negative  for pelvic pain.   Musculoskeletal: Negative for back pain.       Physical Exam   Initial Vitals   BP Pulse Resp Temp SpO2   03/04/17 1417 03/04/17 1417 03/04/17 1417 03/04/17 1417 03/04/17 1417   120/56 86 18 98.3 °F (36.8 °C) 98 %     Physical Exam    Constitutional: She appears well-developed and well-nourished.   HENT:   Right Ear: External ear normal.   Left Ear: External ear normal.   Mouth/Throat: Oropharynx is clear and moist.   Eyes: Conjunctivae and EOM are normal. Pupils are equal, round, and reactive to light.   Neck: Normal range of motion. Neck supple.   Cardiovascular: Normal rate, regular rhythm and normal heart sounds.   Pulmonary/Chest: Breath sounds normal.   Abdominal: Soft. Normal appearance. She exhibits no mass. There is tenderness (right flank). There is no guarding and no CVA tenderness.   Neurological: She is alert and oriented to person, place, and time. She has normal strength.   Skin: Skin is warm. No erythema.   Psychiatric: She has a normal mood and affect.         ED Course   Procedures  Labs Reviewed   URINALYSIS - Abnormal; Notable for the following:        Result Value    Occult Blood UA 2+ (*)     All other components within normal limits   URINALYSIS MICROSCOPIC - Abnormal; Notable for the following:     Bacteria, UA Moderate (*)     All other components within normal limits   CULTURE, URINE   POCT URINE PREGNANCY             Medical Decision Making:   Initial Assessment:   31-year-old female presents to the emergency department for reassessment.  Patient received a certified letter to return to the emergency department for lab results.  Patient had a positive urine culture done on February 15 positive for Escherichia coli.  Patient was contacted by phone multiple times without success.  Patient reports dysuria and right flank pain today.  Patient denies fever, nausea, vomiting, or CVA tenderness.  Patient is afebrile and nontoxic appearing.  Patient has right flank tenderness.   No CVA tenderness.  Urinalysis today reveals moderate bacteria and 2 white blood cells microscopic.  UPT is negative.  I will discharge patient home on Augmentin and Pyridium.  I do not suspect pyelonephritis at this time.  Patient instructed to return to emergency department if symptoms worsen.  Patient is stable for discharge.                   ED Course     Clinical Impression:   The encounter diagnosis was Urinary tract infection without hematuria, site unspecified.          GILBERTO Denis  03/04/17 2034

## 2017-03-06 LAB — BACTERIA UR CULT: NORMAL

## 2017-10-13 ENCOUNTER — OFFICE VISIT (OUTPATIENT)
Dept: OBSTETRICS AND GYNECOLOGY | Facility: CLINIC | Age: 32
End: 2017-10-13
Payer: MEDICAID

## 2017-10-13 VITALS
WEIGHT: 180.75 LBS | SYSTOLIC BLOOD PRESSURE: 119 MMHG | DIASTOLIC BLOOD PRESSURE: 76 MMHG | HEIGHT: 65 IN | BODY MASS INDEX: 30.12 KG/M2

## 2017-10-13 DIAGNOSIS — N95.1 MENOPAUSAL SYMPTOMS: Primary | ICD-10-CM

## 2017-10-13 PROCEDURE — 99213 OFFICE O/P EST LOW 20 MIN: CPT | Mod: S$PBB,,, | Performed by: OBSTETRICS & GYNECOLOGY

## 2017-10-13 PROCEDURE — 99213 OFFICE O/P EST LOW 20 MIN: CPT | Mod: PBBFAC | Performed by: OBSTETRICS & GYNECOLOGY

## 2017-10-13 PROCEDURE — 99999 PR PBB SHADOW E&M-EST. PATIENT-LVL III: CPT | Mod: PBBFAC,,, | Performed by: OBSTETRICS & GYNECOLOGY

## 2017-10-13 NOTE — PROGRESS NOTES
Subjective:       Patient ID: Melissa Larios is a 31 y.o. female.    Chief Complaint:  Hot Flashes; Dizziness; Vaginal Bleeding; and Painful [place]      History of Present Illness  HPI  Pt is having hot flashes, night sweats, and painful intercourse. She is having vaginal dryness. Reports mood swings with some anxiety. She had supracervical hysterectomy: 2016. She reports symptoms started ~3-4 months ago.    GYN & OB History  Patient's last menstrual period was 10/10/2017 (exact date).   Date of Last Pap: 2016    OB History    Para Term  AB Living   5 3 3   1 3   SAB TAB Ectopic Multiple Live Births   1       3      # Outcome Date GA Lbr Ad/2nd Weight Sex Delivery Anes PTL Lv   5 Term 12 39w2d  3.697 kg (8 lb 2.4 oz) F Vag-Spont EPI N JESSICA   4 SAB            3 Term 07   2.892 kg (6 lb 6 oz) F Vag-Spont EPI N JESSICA   2 Term 06   3.204 kg (7 lb 1 oz) M Vag-Spont EPI N JESSICA   1                    Review of Systems  Review of Systems   Endocrine: Positive for hot flashes.   Genitourinary: Positive for vaginal bleeding. Negative for vaginal discharge, vaginal pain and vaginal odor.   Psychiatric/Behavioral: Positive for sleep disturbance. The patient is nervous/anxious.            Objective:    Physical Exam:   Constitutional: She is oriented to person, place, and time. She appears well-developed and well-nourished. No distress.    HENT:   Head: Normocephalic and atraumatic.    Eyes: EOM are normal.      Pulmonary/Chest: No respiratory distress.                      Neurological: She is alert and oriented to person, place, and time.     Psychiatric: She has a normal mood and affect. Her behavior is normal.          Assessment:        1. Menopausal symptoms              Plan:      1. Menopausal symptoms  - A full discussion of the benefit-risk ratio of hormonal replacement therapy was carried out. Improvement in vasomotor and other climacteric symptoms is discussed,  including possible improvements in sleep and mood. Reduction of risk for osteoporosis was explained. We discussed the study data showing increased risk of thrombo-embolic events such as myocardial infarction, stroke and also possibly breast cancer with estrogen replacement, and how this might affect her. The range of side effects such as breast tenderness, weight gain and including possible increases in lifetime risk of breast cancer and possible thrombotic complications was discussed. We also discussed ACOG's recommendation to use hormone replacement therapy for the relief of hot flashes alone and to be on the lowest dose possible for the shortest amount of time.  Alternative such as herbal and soy-based products were reviewed. All of her questions about this therapy were answered.   - estrogen, conjugated,-medroxyprogesterone 0.625-2.5mg (PREMPRO) 0.625-2.5 mg per tablet; Take 1 tablet by mouth once daily.  Dispense: 30 tablet; Refill: 11  - Will try prempro since she is having some bleeding still. May have to consider low dose OCPs due to her her age.        Return in about 3 months (around 1/13/2018) for HRT.

## 2017-12-10 ENCOUNTER — HOSPITAL ENCOUNTER (EMERGENCY)
Facility: OTHER | Age: 32
Discharge: HOME | End: 2017-12-10
Attending: EMERGENCY MEDICINE
Payer: MEDICAID

## 2017-12-10 VITALS
WEIGHT: 185 LBS | RESPIRATION RATE: 18 BRPM | DIASTOLIC BLOOD PRESSURE: 63 MMHG | HEIGHT: 65 IN | BODY MASS INDEX: 30.82 KG/M2 | TEMPERATURE: 99 F | HEART RATE: 77 BPM | SYSTOLIC BLOOD PRESSURE: 119 MMHG | OXYGEN SATURATION: 98 %

## 2017-12-10 DIAGNOSIS — R07.9 CHEST PAIN: ICD-10-CM

## 2017-12-10 LAB
ALBUMIN SERPL BCP-MCNC: 4 G/DL
ALP SERPL-CCNC: 74 U/L
ALT SERPL W/O P-5'-P-CCNC: 17 U/L
ANION GAP SERPL CALC-SCNC: 11 MMOL/L
AST SERPL-CCNC: 27 U/L
B-HCG UR QL: NEGATIVE
BASOPHILS # BLD AUTO: 0.02 K/UL
BASOPHILS NFR BLD: 0.2 %
BILIRUB SERPL-MCNC: 0.2 MG/DL
BUN SERPL-MCNC: 16 MG/DL
CALCIUM SERPL-MCNC: 9.9 MG/DL
CHLORIDE SERPL-SCNC: 104 MMOL/L
CO2 SERPL-SCNC: 25 MMOL/L
CREAT SERPL-MCNC: 0.8 MG/DL
CTP QC/QA: YES
D DIMER PPP IA.FEU-MCNC: 0.49 MG/L FEU
DIFFERENTIAL METHOD: ABNORMAL
EOSINOPHIL # BLD AUTO: 0.3 K/UL
EOSINOPHIL NFR BLD: 2.6 %
ERYTHROCYTE [DISTWIDTH] IN BLOOD BY AUTOMATED COUNT: 12.8 %
EST. GFR  (AFRICAN AMERICAN): >60 ML/MIN/1.73 M^2
EST. GFR  (NON AFRICAN AMERICAN): >60 ML/MIN/1.73 M^2
GLUCOSE SERPL-MCNC: 94 MG/DL
HCT VFR BLD AUTO: 41.8 %
HGB BLD-MCNC: 14.5 G/DL
LYMPHOCYTES # BLD AUTO: 3 K/UL
LYMPHOCYTES NFR BLD: 31.2 %
MCH RBC QN AUTO: 32.1 PG
MCHC RBC AUTO-ENTMCNC: 34.7 G/DL
MCV RBC AUTO: 93 FL
MONOCYTES # BLD AUTO: 0.6 K/UL
MONOCYTES NFR BLD: 6.2 %
NEUTROPHILS # BLD AUTO: 5.7 K/UL
NEUTROPHILS NFR BLD: 59.4 %
PLATELET # BLD AUTO: 280 K/UL
PMV BLD AUTO: 10.8 FL
POTASSIUM SERPL-SCNC: 4.9 MMOL/L
PROT SERPL-MCNC: 8.4 G/DL
RBC # BLD AUTO: 4.52 M/UL
SODIUM SERPL-SCNC: 140 MMOL/L
TROPONIN I SERPL DL<=0.01 NG/ML-MCNC: <0.006 NG/ML
WBC # BLD AUTO: 9.53 K/UL

## 2017-12-10 PROCEDURE — 93005 ELECTROCARDIOGRAM TRACING: CPT

## 2017-12-10 PROCEDURE — 81025 URINE PREGNANCY TEST: CPT | Performed by: EMERGENCY MEDICINE

## 2017-12-10 PROCEDURE — 85025 COMPLETE CBC W/AUTO DIFF WBC: CPT

## 2017-12-10 PROCEDURE — 99284 EMERGENCY DEPT VISIT MOD MDM: CPT | Mod: 25

## 2017-12-10 PROCEDURE — 93010 ELECTROCARDIOGRAM REPORT: CPT | Mod: ,,, | Performed by: INTERNAL MEDICINE

## 2017-12-10 PROCEDURE — 80053 COMPREHEN METABOLIC PANEL: CPT

## 2017-12-10 PROCEDURE — 85379 FIBRIN DEGRADATION QUANT: CPT

## 2017-12-10 PROCEDURE — 84484 ASSAY OF TROPONIN QUANT: CPT

## 2017-12-10 NOTE — ED PROVIDER NOTES
Encounter Date: 12/10/2017    SCRIBE #1 NOTE: I, Andria Herring, am scribing for, and in the presence of, Dr. Martini.       History     Chief Complaint   Patient presents with    Chest Pain     mid sternal CP, described as sharp, radiating to back, symptoms began yesterday, and improve with rest, worsen with activity.     Time seen by provider: 1:21 AM    This is a 32 y.o. female who presents with complaint of left sided chest pain that began yesterday. The intermittent pain radiates to the back, and is described as a stabbing and sharp, pinching pain. She was sitting down when the pain began. She reports mild associated nausea, but denies fever, chills, diaphoresis, fatigue, loss of appetite, congestion, rhinorrhea, cough, SOB, abdominal pain, vomiting, light headedness, or any urinary symptoms. Since onset pain recurs with movement or activity, and improves with rest, with brief episodes. No recent URI or suspected injury. She notes that she has stopped taking hormones (for s/p hysterectomy).      The history is provided by the patient.     Review of patient's allergies indicates:   Allergen Reactions    Codeine Itching     All over itching    Hydrocodone Itching     Past Medical History:   Diagnosis Date    Kidney stones     Rh incompatibility 09/14/2012    rhogam given here    UTI (urinary tract infection)     Vaginal delivery     x4     Past Surgical History:   Procedure Laterality Date    HYSTERECTOMY      TUBAL LIGATION       Family History   Problem Relation Age of Onset    Diabetes Maternal Aunt     Diabetes Paternal Aunt     Vision loss Paternal Uncle     Diabetes Paternal Uncle     Arthritis Maternal Grandmother     Diabetes Maternal Grandmother     Hyperlipidemia Maternal Grandmother     Hypertension Maternal Grandmother     Arthritis Paternal Grandmother     Diabetes Paternal Grandmother     Hypertension Paternal Grandmother     Breast cancer Neg Hx     Colon cancer Neg Hx      Ovarian cancer Neg Hx      Social History   Substance Use Topics    Smoking status: Never Smoker    Smokeless tobacco: Never Used    Alcohol use No     Review of Systems   Constitutional: Negative for appetite change, chills, diaphoresis, fatigue and fever.   HENT: Negative for congestion, rhinorrhea and sore throat.    Respiratory: Negative for cough and shortness of breath.    Cardiovascular: Positive for chest pain.   Gastrointestinal: Positive for nausea. Negative for abdominal pain and vomiting.   Genitourinary: Negative for decreased urine volume, difficulty urinating, dysuria, enuresis, frequency, hematuria and urgency.   Musculoskeletal: Positive for back pain.   Skin: Negative for rash.   Neurological: Negative for weakness and light-headedness.   Hematological: Does not bruise/bleed easily.       Physical Exam     Initial Vitals [12/10/17 0016]   BP Pulse Resp Temp SpO2   124/64 79 18 98.2 °F (36.8 °C) 99 %      MAP       84         Physical Exam    Nursing note and vitals reviewed.  Constitutional: She appears well-developed and well-nourished. She is not diaphoretic. No distress.   HENT:   Head: Normocephalic and atraumatic.   Right Ear: External ear normal.   Left Ear: External ear normal.   Mouth/Throat: Oropharynx is clear and moist.   Eyes: EOM are normal. Pupils are equal, round, and reactive to light. Right eye exhibits no discharge. Left eye exhibits no discharge. No scleral icterus.   Neck: Normal range of motion. Neck supple.   Cardiovascular: Normal rate, regular rhythm and normal heart sounds. Exam reveals no gallop and no friction rub.    No murmur heard.  Pulmonary/Chest: Breath sounds normal. No respiratory distress. She has no wheezes. She has no rhonchi. She has no rales.   She has no reproducible chest wall tenderness.   Abdominal: Soft. Bowel sounds are normal. She exhibits no distension. There is no tenderness. There is no rebound and no guarding.   Musculoskeletal: Normal range of  motion. She exhibits no edema or tenderness.   Lymphadenopathy:     She has no cervical adenopathy.   Neurological: She is alert and oriented to person, place, and time.   Skin: Skin is warm and dry. No rash and no abscess noted. No erythema. No pallor.   Psychiatric: She has a normal mood and affect. Her behavior is normal. Judgment and thought content normal.         ED Course   Procedures  Labs Reviewed   CBC W/ AUTO DIFFERENTIAL - Abnormal; Notable for the following:        Result Value    MCH 32.1 (*)     All other components within normal limits   TROPONIN I   COMPREHENSIVE METABOLIC PANEL   D DIMER, QUANTITATIVE   POCT URINE PREGNANCY     Imaging Results          X-Ray Chest PA And Lateral (Final result)  Result time 12/10/17 01:44:25    Final result by Santana Kenyon MD (12/10/17 01:44:25)                 Impression:         No acute findings in the chest.          Electronically signed by: SANTANA KENYON MD  Date:     12/10/17  Time:    01:44              Narrative:    Chest PA and Lateral    Indication:.    Comparison:None.    Findings:     The cardiomediastinal silhouette is within normal limits.  There is no pleural effusion.  The trachea is midline.  The lungs are symmetrically expanded bilaterally without evidence of acute parenchymal process.  There is no pneumothorax.  The osseous structures are unremarkable.                                   X-Rays:   Independently Interpreted Readings:   Chest X-Ray: Normal heart size.  No infiltrates.  No acute abnormalities.     Medical Decision Making:   Initial Assessment:       32F with no significant PMHx presents with L upper chest pain, sharp brief episodes, worse with movement or activity. No reproducible tenderness on exam, no recent URI, no associated SOB or HILL. Well appearing in no distress and no active CP on exam with mild sinus tachycardia on EKG but no sign ischemia.   Possible costochondritis or chest wall strain, but since not reproducible  will check labs and CXR to r/o unlikely ACS or other acute process. No PE risk factors, she is no longer on estrogen.    Workup with no acute findings, normal CXR and (-) trop and D-dimer. She remained pain free in ED. Stable for discharge with close PCP f/u and return precautions for any worsening, and NSAIDs PRN for suspected chest wall etiology.       Clinical Tests:   Lab Tests: Reviewed and Ordered  Radiological Study: Ordered and Reviewed  Medical Tests: Ordered and Reviewed              Attending Attestation:           Physician Attestation for Scribe:  Physician Attestation Statement for Scribe #1: I, Dr. Martini, reviewed documentation, as scribed by Andria Herring in my presence, and it is both accurate and complete.                 ED Course      Clinical Impression:     1. Chest pain                                 Warren Martini MD  12/11/17 0957

## 2017-12-10 NOTE — ED TRIAGE NOTES
Pt presents to the ED with c/o chest pain starting yesterday. Pain located left chest wall, rated 5/10, intermittent, radiating to her back. Denies use of OTC medication for pain. Pt denies hx of chest pain.

## 2018-04-18 ENCOUNTER — OFFICE VISIT (OUTPATIENT)
Dept: OBSTETRICS AND GYNECOLOGY | Facility: CLINIC | Age: 33
End: 2018-04-18
Payer: MEDICAID

## 2018-04-18 VITALS
HEIGHT: 65 IN | DIASTOLIC BLOOD PRESSURE: 78 MMHG | WEIGHT: 185.19 LBS | SYSTOLIC BLOOD PRESSURE: 116 MMHG | BODY MASS INDEX: 30.85 KG/M2

## 2018-04-18 DIAGNOSIS — R10.2 CHRONIC PELVIC PAIN IN FEMALE: Primary | ICD-10-CM

## 2018-04-18 DIAGNOSIS — G89.29 CHRONIC PELVIC PAIN IN FEMALE: Primary | ICD-10-CM

## 2018-04-18 PROCEDURE — 99999 PR PBB SHADOW E&M-EST. PATIENT-LVL II: CPT | Mod: PBBFAC,,, | Performed by: OBSTETRICS & GYNECOLOGY

## 2018-04-18 PROCEDURE — 99213 OFFICE O/P EST LOW 20 MIN: CPT | Mod: S$PBB,,, | Performed by: OBSTETRICS & GYNECOLOGY

## 2018-04-18 PROCEDURE — 99212 OFFICE O/P EST SF 10 MIN: CPT | Mod: PBBFAC | Performed by: OBSTETRICS & GYNECOLOGY

## 2018-04-18 NOTE — PROGRESS NOTES
"[facility]  Ambulatory Clinic  Obstetrics & Gynecology    Visit Date:  2018    Chief Complaint:  Pelvic cramping    History of Present Illness:      Melissa Larios is a 32 y.o. , new pt to me, here with c/o pelvic cramping.    Pain is dull and crampy, episodic in nature, no particular aggravating factors, and relieved with rest.    Pt has h/o laparoscopic supracervical hysterectomy secondary to abnormal uterine bleeding.    Pt denies h/o abnormal pap, last pap ~2016.    Pt denies active sexually transmitted infections.    Pt performs monthly self breast examination, non-smoker, uses seat belts, and denies abuse.     Pt denies vaginal bleeding, vaginal discharge, dyspareunia, pelvic pain, bloating, early satiety, unintentional weight loss, breast mass/skin changes, incontinence, GI or urinary complaints.      Otherwise, the pt is in her usual state of health and has good follow-up with her PCP.    Past History:  Gynecologic history as noted above.    Review of Systems:      GENERAL:  No fever, fatigue, excessive weight gain or loss  HEENT:  No headaches, hearing changes, visual disturbance  RESPIRATORY:  No cough, shortness of breath  CARDIOVASCULAR:  No chest pain, heart palpitations, leg swelling  BREAST:  No lump, pain, nipple discharge, skin changes  GASTROINTESTINAL:  No nausea, vomiting, constipation, diarrhea, abd pain, rectal bleeding   GENITOURINARY:  See HPI  ENDOCRINE:  No heat or cold intolerance  HEMATOLOGIC:  No easy bruisability or bleeding   LYMPHATICS:  No enlarged nodes  MUSCULOSKELETAL:  No joint pain or swelling  SKIN:  No rash, lesions, jaundice  NEUROLOGIC:  No dizziness, weakness, syncope  PSYCHIATRIC:  No depression, homicidal/suicidal ideations, anxiety or mood swings    Physical Exam:     /78 (BP Location: Right arm, Patient Position: Sitting, BP Method: Large (Manual))   Ht 5' 5" (1.651 m)   Wt 84 kg (185 lb 3 oz)   BMI 30.82 kg/m²   Pulse " 60, Resp rate 18     GENERAL:  No acute distress, well-nourished  HEENT:  Atraumatic, anicteric, moist mucus membranes. Neck supple w/o masses.  BREAST:  Pt declined.  LUNGS:  Clear to auscultation  HEART:  Regular rate and rhythm, no murmurs, gallops, or rubs  ABDOMEN:  Soft, non-tender, non-distended, normoactive bowel sounds, no obvious organomegaly  EXT:  Symmetric w/o cramping, claudication, or edema. +2 distal pulses.  SKIN:  No rashes or bruising  PSYCH:  Mood and affect appropriate    GENITOURINARY:    VULVAR:  Female external genitalia w/o any obvious lesions. Normal urethral meatus. No gross lymphadenopathy.   VAGINA:  Pink, moist, well-rugated. Adequate support. No obvious lesion. No discharge.  CERVIX:   No cervical motion tenderness, discharge, or obvious lesions.  UTERUS:  Surgically absent.   ADNEXA:  No masses, non-tender   RECTAL:  Declined. No obvious external lesions  WET PREP:  Negative     Chaperone present for exam.    Assessment:     32 y.o.  with h/o laparoscopic supracervical hysterectomy:    1. Pelvic pain    Plan:    We discussed her pelvic pain.  No acute GYN findings on pelvic exam, likely musculoskeletal, no alarming sxs today or acute GI/pelvic findings.  Pt declined pelvic ultrasound.  Discussed supportive care measures.  NSAIDs prn.  Pelvic/GI precautions.    Encourage healthy lifestyle modifications, monthly self breast exams, Ca/Vit D.    F/u with PCP for health maintenance.    F/u as needed.  All questions answered, pt voiced understanding.        Serafin Osei MD

## 2018-06-21 ENCOUNTER — HOSPITAL ENCOUNTER (EMERGENCY)
Facility: HOSPITAL | Age: 33
Discharge: HOME | End: 2018-06-21
Attending: EMERGENCY MEDICINE
Payer: MEDICAID

## 2018-06-21 VITALS
HEART RATE: 67 BPM | HEIGHT: 65 IN | DIASTOLIC BLOOD PRESSURE: 74 MMHG | RESPIRATION RATE: 18 BRPM | BODY MASS INDEX: 30.82 KG/M2 | TEMPERATURE: 99 F | SYSTOLIC BLOOD PRESSURE: 132 MMHG | WEIGHT: 185 LBS | OXYGEN SATURATION: 98 %

## 2018-06-21 DIAGNOSIS — R10.9 FLANK PAIN: Primary | ICD-10-CM

## 2018-06-21 LAB
ALBUMIN SERPL BCP-MCNC: 4.1 G/DL
ALP SERPL-CCNC: 70 U/L
ALT SERPL W/O P-5'-P-CCNC: 16 U/L
ANION GAP SERPL CALC-SCNC: 9 MMOL/L
AST SERPL-CCNC: 18 U/L
B-HCG UR QL: NEGATIVE
BACTERIA #/AREA URNS HPF: ABNORMAL /HPF
BASOPHILS # BLD AUTO: 0.04 K/UL
BASOPHILS NFR BLD: 0.5 %
BILIRUB SERPL-MCNC: 0.3 MG/DL
BILIRUB UR QL STRIP: NEGATIVE
BUN SERPL-MCNC: 16 MG/DL
CALCIUM SERPL-MCNC: 9.9 MG/DL
CHLORIDE SERPL-SCNC: 103 MMOL/L
CLARITY UR: CLEAR
CO2 SERPL-SCNC: 26 MMOL/L
COLOR UR: YELLOW
CREAT SERPL-MCNC: 0.9 MG/DL
CTP QC/QA: YES
DIFFERENTIAL METHOD: ABNORMAL
EOSINOPHIL # BLD AUTO: 0.2 K/UL
EOSINOPHIL NFR BLD: 2 %
ERYTHROCYTE [DISTWIDTH] IN BLOOD BY AUTOMATED COUNT: 13.1 %
EST. GFR  (AFRICAN AMERICAN): >60 ML/MIN/1.73 M^2
EST. GFR  (NON AFRICAN AMERICAN): >60 ML/MIN/1.73 M^2
GLUCOSE SERPL-MCNC: 61 MG/DL
GLUCOSE UR QL STRIP: NEGATIVE
HCT VFR BLD AUTO: 40.3 %
HGB BLD-MCNC: 14.2 G/DL
HGB UR QL STRIP: ABNORMAL
KETONES UR QL STRIP: NEGATIVE
LEUKOCYTE ESTERASE UR QL STRIP: ABNORMAL
LIPASE SERPL-CCNC: 26 U/L
LYMPHOCYTES # BLD AUTO: 3.1 K/UL
LYMPHOCYTES NFR BLD: 35.5 %
MCH RBC QN AUTO: 31.8 PG
MCHC RBC AUTO-ENTMCNC: 35.2 G/DL
MCV RBC AUTO: 90 FL
MICROSCOPIC COMMENT: ABNORMAL
MONOCYTES # BLD AUTO: 0.7 K/UL
MONOCYTES NFR BLD: 8.1 %
NEUTROPHILS # BLD AUTO: 4.6 K/UL
NEUTROPHILS NFR BLD: 53.7 %
NITRITE UR QL STRIP: NEGATIVE
PH UR STRIP: 7 [PH] (ref 5–8)
PLATELET # BLD AUTO: 271 K/UL
PMV BLD AUTO: 10.6 FL
POTASSIUM SERPL-SCNC: 4 MMOL/L
PROT SERPL-MCNC: 7.7 G/DL
PROT UR QL STRIP: NEGATIVE
RBC # BLD AUTO: 4.46 M/UL
RBC #/AREA URNS HPF: 3 /HPF (ref 0–4)
SODIUM SERPL-SCNC: 138 MMOL/L
SP GR UR STRIP: 1.02 (ref 1–1.03)
SQUAMOUS #/AREA URNS HPF: 2 /HPF
URN SPEC COLLECT METH UR: ABNORMAL
UROBILINOGEN UR STRIP-ACNC: NEGATIVE EU/DL
WBC # BLD AUTO: 8.64 K/UL
WBC #/AREA URNS HPF: 2 /HPF (ref 0–5)

## 2018-06-21 PROCEDURE — 80053 COMPREHEN METABOLIC PANEL: CPT

## 2018-06-21 PROCEDURE — 96374 THER/PROPH/DIAG INJ IV PUSH: CPT

## 2018-06-21 PROCEDURE — 99284 EMERGENCY DEPT VISIT MOD MDM: CPT | Mod: 25

## 2018-06-21 PROCEDURE — 81025 URINE PREGNANCY TEST: CPT | Performed by: NURSE PRACTITIONER

## 2018-06-21 PROCEDURE — 81000 URINALYSIS NONAUTO W/SCOPE: CPT

## 2018-06-21 PROCEDURE — 63600175 PHARM REV CODE 636 W HCPCS: Performed by: EMERGENCY MEDICINE

## 2018-06-21 PROCEDURE — 85025 COMPLETE CBC W/AUTO DIFF WBC: CPT

## 2018-06-21 PROCEDURE — 83690 ASSAY OF LIPASE: CPT

## 2018-06-21 RX ORDER — MELOXICAM 7.5 MG/1
7.5 TABLET ORAL DAILY
Qty: 15 TABLET | Refills: 0 | Status: SHIPPED | OUTPATIENT
Start: 2018-06-21 | End: 2018-08-05

## 2018-06-21 RX ORDER — KETOROLAC TROMETHAMINE 30 MG/ML
15 INJECTION, SOLUTION INTRAMUSCULAR; INTRAVENOUS
Status: COMPLETED | OUTPATIENT
Start: 2018-06-21 | End: 2018-06-21

## 2018-06-21 RX ADMIN — KETOROLAC TROMETHAMINE 15 MG: 30 INJECTION, SOLUTION INTRAMUSCULAR at 10:06

## 2018-06-22 NOTE — ED PROVIDER NOTES
Encounter Date: 6/21/2018  SORT MSE:  Pt is a 32 y.o. female who presents for emergent consideration for bilateral lower back pain. Pt will be moved to room when one is available, otherwise will wait in waiting room with triage nurse supervision.  Pt arrived by ambulatory. She is not in distress. Orders have been placed. CHASE Brown DNP [facility] 06/21/2018 2133   SCRIBE #1 NOTE: I, Mary Carmen Sandoval, am scribing for, and in the presence of,  Colton Hodges MD. I have scribed the following portions of the note - Other sections scribed: HPI, ROS, PE.       History   No chief complaint on file.    CC: Flank Pain    HPI: 31 y/o female with PMHx of kidney stones, Rh incompatibility, and UTI presents to the ED c/o acute onset bilateral flank pain that starts in lumbar region radiating to lower abdominal region that began this morning. Pt reports the symptoms are moderate (6/10) and constant. Pt presented to the , where she was diagnosed with hematuria and UTI. Pt reports that her current pain feels like previous kidney stones. Pt also c/o changes in urine color. Pt denies chest pain, cough, fever, or chills. No other symptoms reported.       The history is provided by the patient. No  was used.     Review of patient's allergies indicates:   Allergen Reactions    Codeine Itching     All over itching    Hydrocodone Itching     Past Medical History:   Diagnosis Date    Kidney stones     Rh incompatibility 09/14/2012    rhogam given here    UTI (urinary tract infection)     Vaginal delivery     x4     Past Surgical History:   Procedure Laterality Date    HYSTERECTOMY      TUBAL LIGATION       Family History   Problem Relation Age of Onset    Diabetes Maternal Aunt     Diabetes Paternal Aunt     Vision loss Paternal Uncle     Diabetes Paternal Uncle     Arthritis Maternal Grandmother     Diabetes Maternal Grandmother     Hyperlipidemia Maternal Grandmother     Hypertension Maternal  Grandmother     Arthritis Paternal Grandmother     Diabetes Paternal Grandmother     Hypertension Paternal Grandmother     Breast cancer Neg Hx     Colon cancer Neg Hx     Ovarian cancer Neg Hx      Social History   Substance Use Topics    Smoking status: Never Smoker    Smokeless tobacco: Never Used    Alcohol use No     Review of Systems   Constitutional: Negative for chills and fever.   HENT: Negative for congestion, ear pain, rhinorrhea and sore throat.    Eyes: Negative for pain and visual disturbance.   Respiratory: Negative for cough and shortness of breath.    Cardiovascular: Negative for chest pain.   Gastrointestinal: Negative for abdominal pain, diarrhea, nausea and vomiting.   Genitourinary: Positive for flank pain (bilateral flank pain that starts in lumbar region radiating to lower abdominal region) and hematuria.   Musculoskeletal: Negative for neck pain.   Skin: Negative for rash.   Neurological: Negative for headaches.       Physical Exam     Initial Vitals   BP Pulse Resp Temp SpO2   -- -- -- -- --      MAP       --         Physical Exam    Nursing note and vitals reviewed.  Constitutional: She appears well-developed and well-nourished.   HENT:   Head: Normocephalic and atraumatic.   Nose: Nose normal.   Eyes: EOM and lids are normal.   Neck: Neck supple.   Pulmonary/Chest: No respiratory distress.   Abdominal: Normal appearance.   Genitourinary:   Genitourinary Comments: Patient looks uncomfortable and moving around a lot.   Musculoskeletal: Normal range of motion.   Neurological: She is alert.   Skin: Skin is warm and dry.   Psychiatric: She has a normal mood and affect. Her behavior is normal. Thought content normal.         ED Course   Procedures  Labs Reviewed - No data to display       Imaging Results    None                     Scribe Attestation:   Scribe #1: I performed the above scribed service and the documentation accurately describes the services I performed. I attest to the  accuracy of the note.    Attending Attestation:           Physician Attestation for Scribe:  Physician Attestation Statement for Scribe #1: I, Colton Hodges MD, reviewed documentation, as scribed by Mary Carmen Sandoval in my presence, and it is both accurate and complete.     Comments: Patient comes here with pain in her back that radiates into the abdomen and has had a history of kidney stones and she says this felt like the kidney stone physical exam did not have any focal abdominal findings  Workup shows that she has got no active stone right now but may be signs of a passed stone will give her medicine for pain control no acute findings were located on the CT scan               Clinical Impression:   There were no encounter diagnoses.                             Colton Hodges MD  06/21/18 0659       Colton Hodges MD  06/21/18 [id number]

## 2018-08-05 ENCOUNTER — HOSPITAL ENCOUNTER (EMERGENCY)
Facility: HOSPITAL | Age: 33
Discharge: HOME | End: 2018-08-05
Attending: EMERGENCY MEDICINE
Payer: MEDICAID

## 2018-08-05 VITALS
RESPIRATION RATE: 18 BRPM | WEIGHT: 190 LBS | DIASTOLIC BLOOD PRESSURE: 66 MMHG | OXYGEN SATURATION: 95 % | SYSTOLIC BLOOD PRESSURE: 107 MMHG | TEMPERATURE: 98 F | HEIGHT: 65 IN | HEART RATE: 70 BPM | BODY MASS INDEX: 31.65 KG/M2

## 2018-08-05 DIAGNOSIS — R10.31 RLQ ABDOMINAL PAIN: Primary | ICD-10-CM

## 2018-08-05 LAB
ALBUMIN SERPL BCP-MCNC: 4.1 G/DL
ALP SERPL-CCNC: 72 U/L
ALT SERPL W/O P-5'-P-CCNC: 11 U/L
ANION GAP SERPL CALC-SCNC: 9 MMOL/L
AST SERPL-CCNC: 16 U/L
BASOPHILS # BLD AUTO: 0.03 K/UL
BASOPHILS NFR BLD: 0.5 %
BILIRUB SERPL-MCNC: 0.5 MG/DL
BILIRUB UR QL STRIP: NEGATIVE
BUN SERPL-MCNC: 14 MG/DL
CALCIUM SERPL-MCNC: 10 MG/DL
CHLORIDE SERPL-SCNC: 105 MMOL/L
CLARITY UR: CLEAR
CO2 SERPL-SCNC: 23 MMOL/L
COLOR UR: YELLOW
CREAT SERPL-MCNC: 0.8 MG/DL
DIFFERENTIAL METHOD: ABNORMAL
EOSINOPHIL # BLD AUTO: 0.2 K/UL
EOSINOPHIL NFR BLD: 2.6 %
ERYTHROCYTE [DISTWIDTH] IN BLOOD BY AUTOMATED COUNT: 12.6 %
EST. GFR  (AFRICAN AMERICAN): >60 ML/MIN/1.73 M^2
EST. GFR  (NON AFRICAN AMERICAN): >60 ML/MIN/1.73 M^2
GLUCOSE SERPL-MCNC: 89 MG/DL
GLUCOSE UR QL STRIP: NEGATIVE
HCT VFR BLD AUTO: 42.4 %
HGB BLD-MCNC: 14.6 G/DL
HGB UR QL STRIP: ABNORMAL
KETONES UR QL STRIP: NEGATIVE
LEUKOCYTE ESTERASE UR QL STRIP: NEGATIVE
LIPASE SERPL-CCNC: 21 U/L
LYMPHOCYTES # BLD AUTO: 2.4 K/UL
LYMPHOCYTES NFR BLD: 36.6 %
MCH RBC QN AUTO: 31.1 PG
MCHC RBC AUTO-ENTMCNC: 34.4 G/DL
MCV RBC AUTO: 90 FL
MICROSCOPIC COMMENT: NORMAL
MONOCYTES # BLD AUTO: 0.5 K/UL
MONOCYTES NFR BLD: 6.9 %
NEUTROPHILS # BLD AUTO: 3.5 K/UL
NEUTROPHILS NFR BLD: 53.2 %
NITRITE UR QL STRIP: NEGATIVE
PH UR STRIP: 5 [PH] (ref 5–8)
PLATELET # BLD AUTO: 296 K/UL
PMV BLD AUTO: 10.7 FL
POTASSIUM SERPL-SCNC: 4.1 MMOL/L
PROT SERPL-MCNC: 7.6 G/DL
PROT UR QL STRIP: NEGATIVE
RBC # BLD AUTO: 4.7 M/UL
RBC #/AREA URNS HPF: 2 /HPF (ref 0–4)
SODIUM SERPL-SCNC: 137 MMOL/L
SP GR UR STRIP: 1.01 (ref 1–1.03)
SQUAMOUS #/AREA URNS HPF: NORMAL /HPF
URN SPEC COLLECT METH UR: ABNORMAL
UROBILINOGEN UR STRIP-ACNC: NEGATIVE EU/DL
WBC # BLD AUTO: 6.55 K/UL

## 2018-08-05 PROCEDURE — 80053 COMPREHEN METABOLIC PANEL: CPT

## 2018-08-05 PROCEDURE — 99284 EMERGENCY DEPT VISIT MOD MDM: CPT | Mod: 25

## 2018-08-05 PROCEDURE — 81000 URINALYSIS NONAUTO W/SCOPE: CPT

## 2018-08-05 PROCEDURE — 25000003 PHARM REV CODE 250: Performed by: PHYSICIAN ASSISTANT

## 2018-08-05 PROCEDURE — 96374 THER/PROPH/DIAG INJ IV PUSH: CPT

## 2018-08-05 PROCEDURE — 96375 TX/PRO/DX INJ NEW DRUG ADDON: CPT

## 2018-08-05 PROCEDURE — 63600175 PHARM REV CODE 636 W HCPCS: Performed by: PHYSICIAN ASSISTANT

## 2018-08-05 PROCEDURE — 25500020 PHARM REV CODE 255: Performed by: EMERGENCY MEDICINE

## 2018-08-05 PROCEDURE — 83690 ASSAY OF LIPASE: CPT

## 2018-08-05 PROCEDURE — 85025 COMPLETE CBC W/AUTO DIFF WBC: CPT

## 2018-08-05 PROCEDURE — 96361 HYDRATE IV INFUSION ADD-ON: CPT

## 2018-08-05 RX ORDER — HYDROCODONE BITARTRATE AND ACETAMINOPHEN 5; 325 MG/1; MG/1
1 TABLET ORAL EVERY 6 HOURS PRN
Qty: 12 TABLET | Refills: 0 | Status: SHIPPED | OUTPATIENT
Start: 2018-08-05 | End: 2018-08-08

## 2018-08-05 RX ORDER — ONDANSETRON 2 MG/ML
8 INJECTION INTRAMUSCULAR; INTRAVENOUS
Status: COMPLETED | OUTPATIENT
Start: 2018-08-05 | End: 2018-08-05

## 2018-08-05 RX ORDER — KETOROLAC TROMETHAMINE 30 MG/ML
15 INJECTION, SOLUTION INTRAMUSCULAR; INTRAVENOUS
Status: COMPLETED | OUTPATIENT
Start: 2018-08-05 | End: 2018-08-05

## 2018-08-05 RX ADMIN — KETOROLAC TROMETHAMINE 15 MG: 30 INJECTION, SOLUTION INTRAMUSCULAR at 09:08

## 2018-08-05 RX ADMIN — IOHEXOL 75 ML: 350 INJECTION, SOLUTION INTRAVENOUS at 10:08

## 2018-08-05 RX ADMIN — SODIUM CHLORIDE 1000 ML: 0.9 INJECTION, SOLUTION INTRAVENOUS at 09:08

## 2018-08-05 RX ADMIN — ONDANSETRON 8 MG: 2 INJECTION INTRAMUSCULAR; INTRAVENOUS at 09:08

## 2018-08-05 NOTE — ED PROVIDER NOTES
"Encounter Date: 8/5/2018    SORT:  32 y.o. female presenting to ED for RLQ abdominal pain associated with emesis. Different from past renal stones and still has appendix. Limited triage exam:  Non-toxic. If orders were placed, they are pending.     Darvin Stinson PA-C  08/05/2018  9:17 AM      History     Chief Complaint   Patient presents with    Abdominal Pain     Patient stated that she has been having "lower abdominal pains for about 12 hours." Patient stated that she took IB about 6 hours ago for the pain.     Chief complaint:  Abdominal pain    History of present illness:  Patient is a 32-year-old female who reports 1 day of right lower quadrant abdominal pain is intermittent.  She reports the character of the pain is cramping.  She states that ibuprofen was ineffective for relieve this pain and current severity pain is 4/10.  There is associated chills, diarrhea, nausea, vomiting, urinary frequency and urgency.  She denies fever, vaginal bleeding or discharge, hematuria or dysuria.      The history is provided by the patient. No  was used.     Review of patient's allergies indicates:   Allergen Reactions    Codeine Itching     All over itching    Hydrocodone Itching     Past Medical History:   Diagnosis Date    Kidney stones     Rh incompatibility 09/14/2012    rhogam given here    UTI (urinary tract infection)     Vaginal delivery     x4     Past Surgical History:   Procedure Laterality Date    HYSTERECTOMY      TUBAL LIGATION       Family History   Problem Relation Age of Onset    Diabetes Maternal Aunt     Diabetes Paternal Aunt     Vision loss Paternal Uncle     Diabetes Paternal Uncle     Arthritis Maternal Grandmother     Diabetes Maternal Grandmother     Hyperlipidemia Maternal Grandmother     Hypertension Maternal Grandmother     Arthritis Paternal Grandmother     Diabetes Paternal Grandmother     Hypertension Paternal Grandmother     Breast cancer Neg Hx     " Colon cancer Neg Hx     Ovarian cancer Neg Hx      Social History   Substance Use Topics    Smoking status: Never Smoker    Smokeless tobacco: Never Used    Alcohol use No     Review of Systems   Constitutional: Positive for chills. Negative for fatigue and fever.   HENT: Negative for congestion, ear discharge, ear pain, postnasal drip, rhinorrhea, sinus pressure, sneezing, sore throat and voice change.    Eyes: Negative for discharge and itching.   Respiratory: Negative for cough, shortness of breath and wheezing.    Cardiovascular: Negative for chest pain, palpitations and leg swelling.   Gastrointestinal: Positive for abdominal pain, diarrhea, nausea and vomiting. Negative for constipation.   Endocrine: Negative for polydipsia, polyphagia and polyuria.   Genitourinary: Positive for frequency and urgency. Negative for dysuria, hematuria, vaginal bleeding, vaginal discharge and vaginal pain.   Musculoskeletal: Negative for arthralgias and myalgias.   Skin: Negative for rash and wound.   Neurological: Negative for dizziness, seizures, syncope, weakness and numbness.   Hematological: Negative for adenopathy. Does not bruise/bleed easily.   Psychiatric/Behavioral: Negative for self-injury and suicidal ideas. The patient is not nervous/anxious.        Physical Exam     Initial Vitals [08/05/18 0914]   BP Pulse Resp Temp SpO2   129/78 83 18 98.2 °F (36.8 °C) 100 %      MAP       --         Physical Exam    Nursing note and vitals reviewed.  Constitutional: She appears well-developed and well-nourished.   HENT:   Head: Normocephalic and atraumatic.   Right Ear: External ear normal.   Left Ear: External ear normal.   Nose: Nose normal.   Eyes: Conjunctivae and EOM are normal. Pupils are equal, round, and reactive to light. Right eye exhibits no discharge. Left eye exhibits no discharge.   Neck: Normal range of motion.   Cardiovascular: Regular rhythm, S1 normal, S2 normal and normal heart sounds. Exam reveals no  gallop.    No murmur heard.  Pulmonary/Chest: Effort normal and breath sounds normal. No respiratory distress. She has no decreased breath sounds. She has no wheezes. She has no rhonchi. She has no rales.   Abdominal: Soft. Normal appearance and bowel sounds are normal. She exhibits no distension. There is no tenderness.   Musculoskeletal: Normal range of motion.   Neurological: She is alert and oriented to person, place, and time.   Skin: Skin is dry. Capillary refill takes less than 2 seconds.         ED Course   Procedures  Labs Reviewed   CBC W/ AUTO DIFFERENTIAL - Abnormal; Notable for the following:        Result Value    MCH 31.1 (*)     All other components within normal limits   URINALYSIS, REFLEX TO URINE CULTURE - Abnormal; Notable for the following:     Occult Blood UA 1+ (*)     All other components within normal limits    Narrative:     Preferred Collection Type->Urine, Clean Catch   COMPREHENSIVE METABOLIC PANEL   LIPASE   URINALYSIS MICROSCOPIC    Narrative:     Preferred Collection Type->Urine, Clean Catch          Imaging Results          US Pelvis Comp with Transvag NON-OB (xpd) (Final result)  Result time 08/05/18 12:18:14   Procedure changed from US Pelvis Complete Non OB     Final result by Edgardo Hanna MD (08/05/18 12:18:14)                 Impression:      Hysterectomy.    No significant sonographic abnormality.      Electronically signed by: Edgardo Hanna MD  Date:    08/05/2018  Time:    12:18             Narrative:    EXAMINATION:  US PELVIS COMP WITH TRANSVAG NON-OB (XPD)    CLINICAL HISTORY:  rlq abd pain;    TECHNIQUE:  Transabdominal sonography of the pelvis was performed, followed by transvaginal sonography to better evaluate the uterus and ovaries.    COMPARISON:  CT abdomen and pelvis earlier same day    FINDINGS:  Uterus: Patient reportedly status post hysterectomy.  No mass seen within the surgical bed.    Right ovary:    Size: 4.4 x 2.1 x 3.2 cm    Appearance: Normal,  containing several follicles    Vascular flow: Normal.    Left ovary:    Size: 3.3 x 2.1 x 2.8 cm    Appearance: Normal, containing several follicles    Vascular Flow: Normal.    Free Fluid:    None                               CT Abdomen Pelvis With Contrast (Final result)  Result time 08/05/18 10:47:37    Final result by Perfecto Mayen MD (08/05/18 10:47:37)                 Impression:      No CT evidence of acute appendicitis.    Few prominent lymph nodes in right lower quadrant, nonspecific, could indicate mesenteric adenitis.    Small fat containing umbilical hernia.    Stable 1.3 cm hypoattenuating focus along the periphery of the right hepatic lobe, likely benign.      Electronically signed by: Perfecto Mayen MD  Date:    08/05/2018  Time:    10:47             Narrative:    EXAMINATION:  CT ABDOMEN PELVIS WITH CONTRAST    CLINICAL HISTORY:  RLQ abdominal pain. Concern for appendicitis.;    TECHNIQUE:  Low dose axial CT images obtained throughout the region of the abdomen and pelvis following the administration 75 mL Omnipaque 350 intravenous contrast.  Axial, sagittal and coronal reconstructions were performed.    COMPARISON:  6/21/18    FINDINGS:  Lung bases are clear and the visualized portions of the heart and mediastinum are unremarkable.    Stable 1.3 cm hypoattenuating focus along the periphery of the right hepatic lobe (image 23).  No enhancing hepatic lesions identified.  The gallbladder, spleen, pancreas, and adrenal glands appear within normal limits.    Kidneys appear normal. The ureters are decompressed. Urinary bladder and adnexal structures are unremarkable.    The stomach, small bowel and colon demonstrate no evidence of obstruction or focal inflammation.  Appendix does not appear inflamed.  Few prominent lymph nodes in the right lower quadrant.  (Image numbers [id number]).    No free air or free fluid identified within the abdomen or pelvis.    Fat containing umbilical hernia.    Aorta tapers  normally throughout its visualized course.    Osseous structures exhibit mild degenerative changes. No fracture or focal osseous destructive lesion.                                                   ED Course as of Aug 05 1536   Sun Aug 05, 2018   0945 BP: 129/78 [VC]   0945 Temp: 98.2 °F (36.8 °C) [VC]   0945 Pulse: 83 [VC]   0945 Quick look note reviewed.  VS normal.    MEDICAL DECISION MAKING    INITIAL ASSESSMENT: 32 y.o. Hf who presents with rlq abd pain and tenderness, as well as rebound tenderness on exam.  Afebrile, nontoxic, in nad.    ED COURSE: cbc, cmp, amylase, lipase, ua, upt, ct abd pel with contrast    LABS AND RADIOLOGY: see below for lab and radiologic analysis    MEDICATIONS ADMINISTERED: NS1l iv, toradol 15mg ivp, zofran 8mg ivp    DIFFERENTIAL DIAGNOSES: appendicitis, pancreatitis, ovarian torsion, tbo    ACUTE DIAGNOSIS (ES): RLQ abd pain    D/C PLANNING AND MEDICATIONS ORDRED: norco 5/325mg po q6h prn pain (drowsy warning provided)    FOLLOW UP: with pcp asap    PHYSICIAN INTERACTION: Care of the patient discussed with Dr. Tamayo who agreed with the assessment and plan.     Resp: 18 [VC]   0959 CBC: leukocyte count was normal, the H&H was normal. The platelet count was normal.     UA is negative for infection, no nitrites, leukocytes, blood, or protein present.        [VC]   1018 The chemistry was negative for hypo-or hyper natremia, kalemia, chloridemia, or other electrolyte abnormalities; BUN and creatinine were within normal limits indicating normal kidney function, ALT and AST were within normal limits indicating normal liver function, there was no transaminitis.      Lipase was within normal limits indicating unlikely pancreatitis or congestive obstruction of the hepatobiliary tree.    [VC]   1051 No CT evidence of acute appendicitis.    Few prominent lymph nodes in right lower quadrant, nonspecific, could indicate mesenteric adenitis.    Small fat containing umbilical hernia.    Stable  1.3 cm hypoattenuating focus along the periphery of the right hepatic lobe, likely benign. CT Abdomen Pelvis With Contrast [VC]   1227 Hysterectomy.    No significant sonographic abnormality. US Pelvis Comp with Transvag NON-OB (xpd) [VC]   1534 BP: 107/66 [VC]   1534 Temp: 98 °F (36.7 °C) [VC]   1534 Pulse: 70 [VC]   1534 Resp: 18 [VC]   1534 SpO2: 95 % [VC]      ED Course User Index  [VC] Kojo Brown DNP     Clinical Impression:   The encounter diagnosis was RLQ abdominal pain.      Disposition:   Disposition: Discharged  Condition: Stable                        Kojo Brown DNP  08/05/18 0336

## 2018-08-05 NOTE — ED TRIAGE NOTES
Pt. C/o pain to her right lower abd. Pain started on yesterday. Pt. Reports pain is intermitting, symptoms include cramping and stabbing. Pt. Reports emesis this AM. Pt. States pain is 4/10.

## 2019-04-18 NOTE — ED TRIAGE NOTES
"Pt complains of right flank pain started Monday night and has gotten worse. +dysuria. No hematuria. No fevers. +history of kidney stones 1 year ago. Pt reports that she started "vaginal bleeding yesterday", but she has had a hysterectomy 11/15/16.   " Surgeon Performing Repair (Optional): Joel

## 2021-06-21 ENCOUNTER — HOSPITAL ENCOUNTER (EMERGENCY)
Facility: HOSPITAL | Age: 36
Discharge: HOME | End: 2021-06-22
Attending: EMERGENCY MEDICINE
Payer: MEDICAID

## 2021-06-21 DIAGNOSIS — T14.8XXA MUSCLE STRAIN: ICD-10-CM

## 2021-06-21 DIAGNOSIS — R10.9 FLANK PAIN: Primary | ICD-10-CM

## 2021-06-21 LAB
BILIRUB UR QL STRIP: NEGATIVE
CLARITY UR: CLEAR
COLOR UR: COLORLESS
GLUCOSE UR QL STRIP: NEGATIVE
HGB UR QL STRIP: ABNORMAL
KETONES UR QL STRIP: NEGATIVE
LEUKOCYTE ESTERASE UR QL STRIP: NEGATIVE
NITRITE UR QL STRIP: NEGATIVE
PH UR STRIP: 7 [PH] (ref 5–8)
PROT UR QL STRIP: NEGATIVE
SP GR UR STRIP: 1.02 (ref 1–1.03)
URN SPEC COLLECT METH UR: ABNORMAL
UROBILINOGEN UR STRIP-ACNC: NEGATIVE EU/DL

## 2021-06-21 PROCEDURE — 96374 THER/PROPH/DIAG INJ IV PUSH: CPT

## 2021-06-21 PROCEDURE — 96375 TX/PRO/DX INJ NEW DRUG ADDON: CPT

## 2021-06-21 PROCEDURE — 96361 HYDRATE IV INFUSION ADD-ON: CPT

## 2021-06-21 PROCEDURE — 99285 EMERGENCY DEPT VISIT HI MDM: CPT | Mod: 25

## 2021-06-21 PROCEDURE — 81003 URINALYSIS AUTO W/O SCOPE: CPT | Performed by: NURSE PRACTITIONER

## 2021-06-21 RX ORDER — KETOROLAC TROMETHAMINE 30 MG/ML
10 INJECTION, SOLUTION INTRAMUSCULAR; INTRAVENOUS
Status: COMPLETED | OUTPATIENT
Start: 2021-06-21 | End: 2021-06-22

## 2021-06-22 VITALS
RESPIRATION RATE: 18 BRPM | WEIGHT: 185 LBS | HEART RATE: 81 BPM | HEIGHT: 65 IN | BODY MASS INDEX: 30.82 KG/M2 | DIASTOLIC BLOOD PRESSURE: 72 MMHG | OXYGEN SATURATION: 100 % | SYSTOLIC BLOOD PRESSURE: 122 MMHG | TEMPERATURE: 98 F

## 2021-06-22 LAB
ALBUMIN SERPL BCP-MCNC: 3.7 G/DL (ref 3.5–5.2)
ALP SERPL-CCNC: 75 U/L (ref 55–135)
ALT SERPL W/O P-5'-P-CCNC: 16 U/L (ref 10–44)
ANION GAP SERPL CALC-SCNC: 13 MMOL/L (ref 8–16)
AST SERPL-CCNC: 14 U/L (ref 10–40)
BASOPHILS # BLD AUTO: 0.05 K/UL (ref 0–0.2)
BASOPHILS NFR BLD: 0.7 % (ref 0–1.9)
BILIRUB SERPL-MCNC: 0.2 MG/DL (ref 0.1–1)
BUN SERPL-MCNC: 12 MG/DL (ref 6–20)
CALCIUM SERPL-MCNC: 9.3 MG/DL (ref 8.7–10.5)
CHLORIDE SERPL-SCNC: 106 MMOL/L (ref 95–110)
CO2 SERPL-SCNC: 21 MMOL/L (ref 23–29)
CREAT SERPL-MCNC: 0.7 MG/DL (ref 0.5–1.4)
DIFFERENTIAL METHOD: ABNORMAL
EOSINOPHIL # BLD AUTO: 0.2 K/UL (ref 0–0.5)
EOSINOPHIL NFR BLD: 2.6 % (ref 0–8)
ERYTHROCYTE [DISTWIDTH] IN BLOOD BY AUTOMATED COUNT: 12.3 % (ref 11.5–14.5)
EST. GFR  (AFRICAN AMERICAN): >60 ML/MIN/1.73 M^2
EST. GFR  (NON AFRICAN AMERICAN): >60 ML/MIN/1.73 M^2
GLUCOSE SERPL-MCNC: 99 MG/DL (ref 70–110)
HCT VFR BLD AUTO: 38.8 % (ref 37–48.5)
HGB BLD-MCNC: 13.1 G/DL (ref 12–16)
IMM GRANULOCYTES # BLD AUTO: 0.03 K/UL (ref 0–0.04)
IMM GRANULOCYTES NFR BLD AUTO: 0.4 % (ref 0–0.5)
LIPASE SERPL-CCNC: 21 U/L (ref 4–60)
LYMPHOCYTES # BLD AUTO: 2.8 K/UL (ref 1–4.8)
LYMPHOCYTES NFR BLD: 36.5 % (ref 18–48)
MCH RBC QN AUTO: 31.3 PG (ref 27–31)
MCHC RBC AUTO-ENTMCNC: 33.8 G/DL (ref 32–36)
MCV RBC AUTO: 93 FL (ref 82–98)
MONOCYTES # BLD AUTO: 0.6 K/UL (ref 0.3–1)
MONOCYTES NFR BLD: 7.7 % (ref 4–15)
NEUTROPHILS # BLD AUTO: 4 K/UL (ref 1.8–7.7)
NEUTROPHILS NFR BLD: 52.1 % (ref 38–73)
NRBC BLD-RTO: 0 /100 WBC
PLATELET # BLD AUTO: 281 K/UL (ref 150–450)
PMV BLD AUTO: 10.3 FL (ref 9.2–12.9)
POTASSIUM SERPL-SCNC: 3.7 MMOL/L (ref 3.5–5.1)
PROT SERPL-MCNC: 6.9 G/DL (ref 6–8.4)
RBC # BLD AUTO: 4.18 M/UL (ref 4–5.4)
SODIUM SERPL-SCNC: 140 MMOL/L (ref 136–145)
WBC # BLD AUTO: 7.67 K/UL (ref 3.9–12.7)

## 2021-06-22 PROCEDURE — 63600175 PHARM REV CODE 636 W HCPCS: Performed by: NURSE PRACTITIONER

## 2021-06-22 PROCEDURE — 25500020 PHARM REV CODE 255: Performed by: EMERGENCY MEDICINE

## 2021-06-22 PROCEDURE — 83690 ASSAY OF LIPASE: CPT | Performed by: EMERGENCY MEDICINE

## 2021-06-22 PROCEDURE — 25000003 PHARM REV CODE 250: Performed by: EMERGENCY MEDICINE

## 2021-06-22 PROCEDURE — 63600175 PHARM REV CODE 636 W HCPCS: Performed by: EMERGENCY MEDICINE

## 2021-06-22 PROCEDURE — 85025 COMPLETE CBC W/AUTO DIFF WBC: CPT | Performed by: NURSE PRACTITIONER

## 2021-06-22 PROCEDURE — 80053 COMPREHEN METABOLIC PANEL: CPT | Performed by: NURSE PRACTITIONER

## 2021-06-22 RX ORDER — METHOCARBAMOL 750 MG/1
750 TABLET, FILM COATED ORAL
Status: COMPLETED | OUTPATIENT
Start: 2021-06-22 | End: 2021-06-22

## 2021-06-22 RX ORDER — MORPHINE SULFATE 2 MG/ML
6 INJECTION, SOLUTION INTRAMUSCULAR; INTRAVENOUS
Status: COMPLETED | OUTPATIENT
Start: 2021-06-22 | End: 2021-06-22

## 2021-06-22 RX ORDER — METHOCARBAMOL 500 MG/1
1000 TABLET, FILM COATED ORAL 3 TIMES DAILY PRN
Qty: 30 TABLET | Refills: 0 | Status: SHIPPED | OUTPATIENT
Start: 2021-06-22 | End: 2021-06-27

## 2021-06-22 RX ORDER — SODIUM CHLORIDE 9 MG/ML
INJECTION, SOLUTION INTRAVENOUS
Status: COMPLETED | OUTPATIENT
Start: 2021-06-22 | End: 2021-06-22

## 2021-06-22 RX ORDER — LIDOCAINE 50 MG/G
1 PATCH TOPICAL DAILY
Qty: 30 PATCH | Refills: 0 | Status: SHIPPED | OUTPATIENT
Start: 2021-06-22

## 2021-06-22 RX ORDER — ONDANSETRON 2 MG/ML
4 INJECTION INTRAMUSCULAR; INTRAVENOUS
Status: COMPLETED | OUTPATIENT
Start: 2021-06-22 | End: 2021-06-22

## 2021-06-22 RX ORDER — LIDOCAINE 50 MG/G
1 PATCH TOPICAL
Status: DISCONTINUED | OUTPATIENT
Start: 2021-06-22 | End: 2021-06-22 | Stop reason: HOSPADM

## 2021-06-22 RX ADMIN — ONDANSETRON 4 MG: 2 INJECTION INTRAMUSCULAR; INTRAVENOUS at 12:06

## 2021-06-22 RX ADMIN — KETOROLAC TROMETHAMINE 10 MG: 30 INJECTION, SOLUTION INTRAMUSCULAR; INTRAVENOUS at 12:06

## 2021-06-22 RX ADMIN — METHOCARBAMOL TABLETS 750 MG: 750 TABLET, COATED ORAL at 03:06

## 2021-06-22 RX ADMIN — SODIUM CHLORIDE: 0.9 INJECTION, SOLUTION INTRAVENOUS at 12:06

## 2021-06-22 RX ADMIN — IOHEXOL 100 ML: 350 INJECTION, SOLUTION INTRAVENOUS at 01:06

## 2021-06-22 RX ADMIN — LIDOCAINE 1 PATCH: 50 PATCH TOPICAL at 03:06

## 2021-06-22 RX ADMIN — MORPHINE SULFATE 6 MG: 2 INJECTION, SOLUTION INTRAMUSCULAR; INTRAVENOUS at 01:06

## 2022-01-07 ENCOUNTER — HOSPITAL ENCOUNTER (EMERGENCY)
Facility: HOSPITAL | Age: 37
Discharge: HOME | End: 2022-01-07
Attending: EMERGENCY MEDICINE
Payer: MEDICAID

## 2022-01-07 VITALS
OXYGEN SATURATION: 95 % | HEIGHT: 65 IN | TEMPERATURE: 98 F | HEART RATE: 70 BPM | RESPIRATION RATE: 14 BRPM | DIASTOLIC BLOOD PRESSURE: 75 MMHG | SYSTOLIC BLOOD PRESSURE: 118 MMHG | WEIGHT: 200 LBS | BODY MASS INDEX: 33.32 KG/M2

## 2022-01-07 DIAGNOSIS — R07.89 CHEST WALL PAIN: Primary | ICD-10-CM

## 2022-01-07 DIAGNOSIS — R07.9 CHEST PAIN: ICD-10-CM

## 2022-01-07 LAB
ALBUMIN SERPL BCP-MCNC: 4.1 G/DL (ref 3.5–5.2)
ALP SERPL-CCNC: 76 U/L (ref 55–135)
ALT SERPL W/O P-5'-P-CCNC: 43 U/L (ref 10–44)
ANION GAP SERPL CALC-SCNC: 12 MMOL/L (ref 8–16)
AST SERPL-CCNC: 24 U/L (ref 10–40)
BASOPHILS # BLD AUTO: 0.06 K/UL (ref 0–0.2)
BASOPHILS NFR BLD: 0.6 % (ref 0–1.9)
BILIRUB SERPL-MCNC: 0.6 MG/DL (ref 0.1–1)
BNP SERPL-MCNC: <10 PG/ML (ref 0–99)
BUN SERPL-MCNC: 11 MG/DL (ref 6–20)
CALCIUM SERPL-MCNC: 9.8 MG/DL (ref 8.7–10.5)
CHLORIDE SERPL-SCNC: 101 MMOL/L (ref 95–110)
CO2 SERPL-SCNC: 25 MMOL/L (ref 23–29)
CREAT SERPL-MCNC: 0.8 MG/DL (ref 0.5–1.4)
D DIMER PPP IA.FEU-MCNC: 0.46 MG/L FEU
DIFFERENTIAL METHOD: ABNORMAL
EOSINOPHIL # BLD AUTO: 0.2 K/UL (ref 0–0.5)
EOSINOPHIL NFR BLD: 1.8 % (ref 0–8)
ERYTHROCYTE [DISTWIDTH] IN BLOOD BY AUTOMATED COUNT: 13 % (ref 11.5–14.5)
EST. GFR  (AFRICAN AMERICAN): >60 ML/MIN/1.73 M^2
EST. GFR  (NON AFRICAN AMERICAN): >60 ML/MIN/1.73 M^2
GLUCOSE SERPL-MCNC: 116 MG/DL (ref 70–110)
HCT VFR BLD AUTO: 42.7 % (ref 37–48.5)
HGB BLD-MCNC: 14.6 G/DL (ref 12–16)
IMM GRANULOCYTES # BLD AUTO: 0.04 K/UL (ref 0–0.04)
IMM GRANULOCYTES NFR BLD AUTO: 0.4 % (ref 0–0.5)
LYMPHOCYTES # BLD AUTO: 3.1 K/UL (ref 1–4.8)
LYMPHOCYTES NFR BLD: 31.9 % (ref 18–48)
MCH RBC QN AUTO: 31.7 PG (ref 27–31)
MCHC RBC AUTO-ENTMCNC: 34.2 G/DL (ref 32–36)
MCV RBC AUTO: 93 FL (ref 82–98)
MONOCYTES # BLD AUTO: 0.7 K/UL (ref 0.3–1)
MONOCYTES NFR BLD: 7.2 % (ref 4–15)
NEUTROPHILS # BLD AUTO: 5.6 K/UL (ref 1.8–7.7)
NEUTROPHILS NFR BLD: 58.1 % (ref 38–73)
NRBC BLD-RTO: 0 /100 WBC
PLATELET # BLD AUTO: 242 K/UL (ref 150–450)
PMV BLD AUTO: 10.7 FL (ref 9.2–12.9)
POTASSIUM SERPL-SCNC: 4.2 MMOL/L (ref 3.5–5.1)
PROT SERPL-MCNC: 7.6 G/DL (ref 6–8.4)
RBC # BLD AUTO: 4.6 M/UL (ref 4–5.4)
SODIUM SERPL-SCNC: 138 MMOL/L (ref 136–145)
TROPONIN I SERPL DL<=0.01 NG/ML-MCNC: <0.006 NG/ML (ref 0–0.03)
WBC # BLD AUTO: 9.58 K/UL (ref 3.9–12.7)

## 2022-01-07 PROCEDURE — 96374 THER/PROPH/DIAG INJ IV PUSH: CPT

## 2022-01-07 PROCEDURE — 63600175 PHARM REV CODE 636 W HCPCS: Performed by: PHYSICIAN ASSISTANT

## 2022-01-07 PROCEDURE — 25000003 PHARM REV CODE 250: Performed by: PHYSICIAN ASSISTANT

## 2022-01-07 PROCEDURE — 83880 ASSAY OF NATRIURETIC PEPTIDE: CPT | Performed by: EMERGENCY MEDICINE

## 2022-01-07 PROCEDURE — 93010 EKG 12-LEAD: ICD-10-PCS | Mod: ,,, | Performed by: INTERNAL MEDICINE

## 2022-01-07 PROCEDURE — 99285 EMERGENCY DEPT VISIT HI MDM: CPT | Mod: 25

## 2022-01-07 PROCEDURE — 80053 COMPREHEN METABOLIC PANEL: CPT | Performed by: EMERGENCY MEDICINE

## 2022-01-07 PROCEDURE — 84484 ASSAY OF TROPONIN QUANT: CPT | Performed by: EMERGENCY MEDICINE

## 2022-01-07 PROCEDURE — 93005 ELECTROCARDIOGRAM TRACING: CPT

## 2022-01-07 PROCEDURE — 85025 COMPLETE CBC W/AUTO DIFF WBC: CPT | Performed by: EMERGENCY MEDICINE

## 2022-01-07 PROCEDURE — 25000003 PHARM REV CODE 250: Performed by: EMERGENCY MEDICINE

## 2022-01-07 PROCEDURE — 85379 FIBRIN DEGRADATION QUANT: CPT | Performed by: EMERGENCY MEDICINE

## 2022-01-07 PROCEDURE — 93010 ELECTROCARDIOGRAM REPORT: CPT | Mod: ,,, | Performed by: INTERNAL MEDICINE

## 2022-01-07 RX ORDER — IBUPROFEN 600 MG/1
600 TABLET ORAL EVERY 6 HOURS PRN
Qty: 20 TABLET | Refills: 0 | Status: SHIPPED | OUTPATIENT
Start: 2022-01-07 | End: 2022-01-12

## 2022-01-07 RX ORDER — ACETAMINOPHEN 500 MG
500 TABLET ORAL EVERY 4 HOURS PRN
Qty: 20 TABLET | Refills: 0 | Status: SHIPPED | OUTPATIENT
Start: 2022-01-07 | End: 2022-01-12

## 2022-01-07 RX ORDER — ASPIRIN 325 MG
325 TABLET, DELAYED RELEASE (ENTERIC COATED) ORAL
Status: COMPLETED | OUTPATIENT
Start: 2022-01-07 | End: 2022-01-07

## 2022-01-07 RX ORDER — CYCLOBENZAPRINE HCL 10 MG
10 TABLET ORAL 3 TIMES DAILY PRN
Qty: 20 TABLET | Refills: 0 | Status: SHIPPED | OUTPATIENT
Start: 2022-01-07 | End: 2022-01-14

## 2022-01-07 RX ORDER — PANTOPRAZOLE SODIUM 20 MG/1
20 TABLET, DELAYED RELEASE ORAL DAILY
Qty: 30 TABLET | Refills: 0 | Status: SHIPPED | OUTPATIENT
Start: 2022-01-07 | End: 2022-02-06

## 2022-01-07 RX ORDER — KETOROLAC TROMETHAMINE 30 MG/ML
15 INJECTION, SOLUTION INTRAMUSCULAR; INTRAVENOUS
Status: COMPLETED | OUTPATIENT
Start: 2022-01-07 | End: 2022-01-07

## 2022-01-07 RX ADMIN — ASPIRIN 325 MG: 325 TABLET, COATED ORAL at 04:01

## 2022-01-07 RX ADMIN — LIDOCAINE HYDROCHLORIDE: 20 SOLUTION ORAL; TOPICAL at 04:01

## 2022-01-07 RX ADMIN — KETOROLAC TROMETHAMINE 15 MG: 30 INJECTION, SOLUTION INTRAMUSCULAR at 04:01

## 2022-01-07 NOTE — DISCHARGE INSTRUCTIONS

## 2022-01-07 NOTE — ED PROVIDER NOTES
Encounter Date: 1/7/2022       History     Chief Complaint   Patient presents with    Chest Pain     PT with acute onset 8/10 burning chest that radiates to neck. PT denies cardiac history.     Chief Complaint:  Chest pain  History of  Present Illness: History obtained from patient. This 36 y.o. female who has no significant past medical history presents to the ED complaining of sudden onset of left-sided chest pain that radiates up to her neck that began at 9:30 p.m. last night.  Patient states the pain has been gradually worsening is currently 8/10.  Patient describes the pain as a sharp sensation.  Denies shortness of breath, cough, nausea, vomiting, fever, chills, back pain.  Denies trauma.  Denies history of DVT/PE, OCP use, recent trauma,, unilateral leg pain or swelling, recent surgery or long periods of immobility.  No prior treatment for symptoms.  Denies personal history of cardiac disease.  Patient reports history of cardiac disease in her grandparents at advanced age.        Review of patient's allergies indicates:   Allergen Reactions    Codeine Itching     All over itching    Hydrocodone Itching     Past Medical History:   Diagnosis Date    Kidney stones     Rh incompatibility 09/14/2012    rhogam given here    UTI (urinary tract infection)     Vaginal delivery     x4     Past Surgical History:   Procedure Laterality Date    HYSTERECTOMY      TUBAL LIGATION       Family History   Problem Relation Age of Onset    Diabetes Maternal Aunt     Diabetes Paternal Aunt     Vision loss Paternal Uncle     Diabetes Paternal Uncle     Arthritis Maternal Grandmother     Diabetes Maternal Grandmother     Hyperlipidemia Maternal Grandmother     Hypertension Maternal Grandmother     Arthritis Paternal Grandmother     Diabetes Paternal Grandmother     Hypertension Paternal Grandmother     Breast cancer Neg Hx     Colon cancer Neg Hx     Ovarian cancer Neg Hx      Social History     Tobacco Use     Smoking status: Never Smoker    Smokeless tobacco: Never Used   Substance Use Topics    Alcohol use: No    Drug use: No     Review of Systems   Constitutional: Negative for chills and fever.   HENT: Negative for congestion, rhinorrhea and sore throat.    Eyes: Negative for visual disturbance.   Respiratory: Negative for cough and shortness of breath.    Cardiovascular: Positive for chest pain.   Gastrointestinal: Negative for abdominal pain, diarrhea, nausea and vomiting.   Genitourinary: Negative for dysuria, frequency and hematuria.   Musculoskeletal: Negative for back pain.   Skin: Negative for rash.   Neurological: Negative for dizziness, weakness and headaches.       Physical Exam     Initial Vitals [01/07/22 0342]   BP Pulse Resp Temp SpO2   (!) 145/80 100 16 98.3 °F (36.8 °C) 99 %      MAP       --         Physical Exam    Nursing note and vitals reviewed.  Constitutional: She appears well-developed and well-nourished. No distress.   HENT:   Head: Normocephalic and atraumatic.   Right Ear: Tympanic membrane normal.   Left Ear: Tympanic membrane normal.   Nose: Nose normal.   Mouth/Throat: Uvula is midline, oropharynx is clear and moist and mucous membranes are normal.   Eyes: EOM are normal. Pupils are equal, round, and reactive to light.   Neck: Trachea normal and phonation normal. Neck supple. No stridor present.   Normal range of motion.   Full passive range of motion without pain.     Cardiovascular: Normal rate, regular rhythm and normal heart sounds. Exam reveals no gallop and no friction rub.    No murmur heard.  Pulmonary/Chest: Effort normal and breath sounds normal. No respiratory distress. She has no wheezes. She has no rhonchi. She has no rales. She exhibits tenderness (There is tenderness to palpation to the left anterior chest wall).   Abdominal: Abdomen is soft. Bowel sounds are normal. There is no abdominal tenderness. There is no rebound and no guarding.   Musculoskeletal:          General: Normal range of motion.      Cervical back: Full passive range of motion without pain, normal range of motion and neck supple. No rigidity. No spinous process tenderness or muscular tenderness. Normal range of motion.     Neurological: She is alert and oriented to person, place, and time. She has normal strength. No cranial nerve deficit or sensory deficit.   Skin: Skin is warm and dry. Capillary refill takes less than 2 seconds.   Psychiatric: She has a normal mood and affect.         ED Course   Procedures  Labs Reviewed   CBC W/ AUTO DIFFERENTIAL - Abnormal; Notable for the following components:       Result Value    MCH 31.7 (*)     All other components within normal limits   COMPREHENSIVE METABOLIC PANEL - Abnormal; Notable for the following components:    Glucose 116 (*)     All other components within normal limits   TROPONIN I   B-TYPE NATRIURETIC PEPTIDE   D DIMER, QUANTITATIVE     EKG Readings: (Independently Interpreted)   Initial Reading: No STEMI. Rhythm: Sinus Tachycardia. Heart Rate: 105. Ectopy: No Ectopy. Conduction: Normal. ST Segments: Normal ST Segments. T Waves: Normal. T Waves Flipped: V4, V5, V6, V3, II, III and AVF. Clinical Impression: Normal Sinus Rhythm       Imaging Results          X-Ray Chest AP Portable (Final result)  Result time 01/07/22 06:16:17    Final result by Robert Berry MD (01/07/22 06:16:17)                 Impression:      No obvious evidence of an acute pulmonary process.      Electronically signed by: Robert Berry  Date:    01/07/2022  Time:    06:16             Narrative:    EXAMINATION:  XR CHEST AP PORTABLE    CLINICAL HISTORY:  Chest Pain;    TECHNIQUE:  Single frontal view of the chest was performed.    COMPARISON:  December 10, 2017    FINDINGS:  Single view of the chest reveals the lungs are well aerated.  No indication of a consolidative process or pleural effusion.  No pulmonary nodule.  The heart is normal in size and contour.  There is no  evidence of free air underneath the diaphragm.  There is elevation of the right hemidiaphragm.                                 Medications   (pyxis) gi cocktail (mylanta 30 mL, LIDOcaine 2 % viscous 10 mL, dicyclomine 10 mL) 50 mL ( Oral Given 1/7/22 1562)   aspirin EC tablet 325 mg (325 mg Oral Given 1/7/22 5937)   ketorolac injection 15 mg (15 mg Intravenous Given 1/7/22 7282)     Medical Decision Making:   ED Management:  36 y.o. presenting secondary to chest pain. Patient is at lower risk of ACS due to risk factors and HPI with a heart score of 0. Patient has received an aspirin. EKG was reassuring and chest xray showed nothing acute. Most likely musculoskeletal cause of patient.     [email or web address]    Also considered but less likely:     PE: normal rate, no sob/recent immobilization/surgery/travel/family history. PERC negative.  D-dimer negative.  Pneumonia: chest xray negative. No fever. No cough and lungs non consistent with pna  Tamponade: unlikely due to chest xray and ekg  STEMI: No STEMI on ekg  Dissection: equal pulses bilaterally and no ripping chest pain to the back  Esophageal rupture: no dysphagia or vomiting and chest xray negative for mediastinal air  Arrhythmia: no arrhythmia on ekg  CHF: no fluid overload on Cxr and physical exam  Pneumothorax: bilateral breath sounds and no signs of pneumothorax on chest xray     Patient felt improved with interventions and has a lower risk of impending cardiac failure to the heart score of 0. Return precautions given, patient understands and agrees with plan. All questions answered.  Instructed to follow up with PCP.     This is Anh Guevara PA-C dictating. Assumed care of pt from Elan Osei PA-C   Patient has family history of MI in uncle and grandmother. Endorses recent COVID-19 contact with a friend but has not received results of their swab. No URI symptoms. Refusing any additional medications.   CXR negative.  Will refer to cardiology and GI. Will have pt return to ER for worsneing symptoms or as needed    Additional MDM:   PERC Rule:   Age is greater than or equal to 50 = 0.0  Heart Rate is greater than or equal to 100 = 0.0  SaO2 on room air < 95% = 0.0  Unilateral leg swelling = 0.0  Hemoptysis = 0.0  Recent surgery or trauma = 0.0  Prior PE or DVT =  0.0  Hormone use = 0.00  PERC Score = 0  Heart Score:    History:          Slightly suspicious.  ECG:             Normal  Age:               Less than 45 years  Risk factors: no risk factors known  Troponin:       Less than or equal to normal limit  Final Score: 0                       Clinical Impression:   Final diagnoses:  [R07.9] Chest pain  [R07.89] Chest wall pain (Primary)          ED Disposition Condition    Discharge Stable        ED Prescriptions     Medication Sig Dispense Start Date End Date Auth. Provider    ibuprofen (ADVIL,MOTRIN) 600 MG tablet Take 1 tablet (600 mg total) by mouth every 6 (six) hours as needed for Pain. 20 tablet 1/7/2022 1/12/2022 Anh Guevara PA-C    acetaminophen (TYLENOL) 500 MG tablet Take 1 tablet (500 mg total) by mouth every 4 (four) hours as needed. 20 tablet 1/7/2022 1/12/2022 Anh Guevara PA-C    cyclobenzaprine (FLEXERIL) 10 MG tablet Take 1 tablet (10 mg total) by mouth 3 (three) times daily as needed. 20 tablet 1/7/2022 1/14/2022 Anh Guevara PA-C    pantoprazole (PROTONIX) 20 MG tablet Take 1 tablet (20 mg total) by mouth once daily. 30 tablet 1/7/2022 2/6/2022 Anh Guevara PA-C        Follow-up Information     Follow up With Specialties Details Why Contact Info    Your Primary Care Doctor  Schedule an appointment as soon as possible for a visit in 2 days      Jaime Walters MD Gastroenterology Schedule an appointment as soon as possible for a visit in 2 days for follow up with GI [address] [place] [address]  [address]  [facility]  [address]  [id number]  [phone]      Julian Ruff MD Cardiology, INTERVENTIONAL CARDIOLOGY Schedule an appointment as soon as possible for a visit in 2 days for follow up [address]  [address]  [facility] [address]  [phone]      [facility] Emergency Medicine Go to  As needed, If symptoms worsen [address]  [facility] [id number] [phone]           Anh Guevara PA-C  01/07/22 1346

## 2022-01-07 NOTE — Clinical Note
"Melissa"Alyssa Larios was seen and treated in our emergency department on 1/7/2022.  She may return to work on 01/10/2022.       If you have any questions or concerns, please don't hesitate to call.      Anh Guevara PA-C"

## 2022-01-07 NOTE — ED TRIAGE NOTES
Patient identifiers for Melissa Larios 36 y.o. female checked and correct.  Chief Complaint   Patient presents with    Chest Pain     PT with acute onset 8/10 burning chest that radiates to neck. PT denies cardiac history.     Past Medical History:   Diagnosis Date    Kidney stones     Rh incompatibility 09/14/2012    rhogam given here    UTI (urinary tract infection)     Vaginal delivery     x4     Allergies reported:   Review of patient's allergies indicates:   Allergen Reactions    Codeine Itching     All over itching    Hydrocodone Itching         LOC: Patient is awake, alert, and aware of environment with an appropriate affect. Patient is oriented x 3 and speaking appropriately.  APPEARANCE: Patient resting comfortably and in no acute distress. Patient is clean and well groomed, patient's clothing is properly fastened.  SKIN: The skin is warm and dry. Patient has normal skin turgor and moist mucus membranes. Skin is intact; no bruising or breakdown noted.  MUSKULOSKELETAL: Patient is moving all extremities well, no obvious deformities noted. Pulses intact.   RESPIRATORY: Airway is open and patent. Respirations are spontaneous and non-labored with normal effort and rate, BBS=clear  CARDIAC: Patient has a normal rate and rhythm. Pt on cardiac monitor,No peripheral edema noted. +non radiating chest pain.   ABDOMEN: No distention noted. Bowel sounds active in all 4 quadrants. Soft and non-tender upon palpation.  NEUROLOGICAL: PERRL. Facial expression is symmetrical. Hand grasps are equal bilaterally. Normal sensation in all extremities when touched with finger.